# Patient Record
Sex: FEMALE | Race: BLACK OR AFRICAN AMERICAN | Employment: FULL TIME | URBAN - METROPOLITAN AREA
[De-identification: names, ages, dates, MRNs, and addresses within clinical notes are randomized per-mention and may not be internally consistent; named-entity substitution may affect disease eponyms.]

---

## 2020-04-23 ENCOUNTER — HOSPITAL ENCOUNTER (EMERGENCY)
Age: 40
Discharge: HOME | End: 2020-04-23
Attending: STUDENT IN AN ORGANIZED HEALTH CARE EDUCATION/TRAINING PROGRAM | Admitting: STUDENT IN AN ORGANIZED HEALTH CARE EDUCATION/TRAINING PROGRAM
Payer: COMMERCIAL

## 2020-04-23 VITALS
SYSTOLIC BLOOD PRESSURE: 133 MMHG | HEART RATE: 84 BPM | WEIGHT: 160 LBS | BODY MASS INDEX: 26.66 KG/M2 | HEIGHT: 65 IN | TEMPERATURE: 99.1 F | RESPIRATION RATE: 16 BRPM | DIASTOLIC BLOOD PRESSURE: 85 MMHG | OXYGEN SATURATION: 100 %

## 2020-04-23 DIAGNOSIS — B02.30 HERPES ZOSTER WITH OPHTHALMIC COMPLICATION, UNSPECIFIED HERPES ZOSTER EYE DISEASE: Primary | ICD-10-CM

## 2020-04-23 PROCEDURE — 74011250637 HC RX REV CODE- 250/637: Performed by: STUDENT IN AN ORGANIZED HEALTH CARE EDUCATION/TRAINING PROGRAM

## 2020-04-23 PROCEDURE — 99284 EMERGENCY DEPT VISIT MOD MDM: CPT

## 2020-04-23 PROCEDURE — 74011636637 HC RX REV CODE- 636/637: Performed by: STUDENT IN AN ORGANIZED HEALTH CARE EDUCATION/TRAINING PROGRAM

## 2020-04-23 PROCEDURE — 74011000250 HC RX REV CODE- 250: Performed by: STUDENT IN AN ORGANIZED HEALTH CARE EDUCATION/TRAINING PROGRAM

## 2020-04-23 RX ORDER — PREDNISONE 20 MG/1
60 TABLET ORAL DAILY
Qty: 18 TAB | Refills: 0 | Status: SHIPPED | OUTPATIENT
Start: 2020-04-24 | End: 2020-04-30

## 2020-04-23 RX ORDER — TETRACAINE HYDROCHLORIDE 5 MG/ML
1 SOLUTION OPHTHALMIC
Status: COMPLETED | OUTPATIENT
Start: 2020-04-23 | End: 2020-04-23

## 2020-04-23 RX ORDER — PREDNISONE 20 MG/1
60 TABLET ORAL
Status: COMPLETED | OUTPATIENT
Start: 2020-04-23 | End: 2020-04-23

## 2020-04-23 RX ORDER — OXYCODONE AND ACETAMINOPHEN 5; 325 MG/1; MG/1
1 TABLET ORAL
Status: DISCONTINUED | OUTPATIENT
Start: 2020-04-23 | End: 2020-04-23 | Stop reason: HOSPADM

## 2020-04-23 RX ORDER — VALACYCLOVIR HYDROCHLORIDE 500 MG/1
500 TABLET, FILM COATED ORAL
Status: COMPLETED | OUTPATIENT
Start: 2020-04-23 | End: 2020-04-23

## 2020-04-23 RX ORDER — VALACYCLOVIR HYDROCHLORIDE 500 MG/1
500 TABLET, FILM COATED ORAL 2 TIMES DAILY
Qty: 13 TAB | Refills: 0 | Status: SHIPPED | OUTPATIENT
Start: 2020-04-23 | End: 2020-04-30

## 2020-04-23 RX ADMIN — FLUORESCEIN SODIUM 1 STRIP: 1 STRIP OPHTHALMIC at 13:30

## 2020-04-23 RX ADMIN — PREDNISONE 60 MG: 20 TABLET ORAL at 14:51

## 2020-04-23 RX ADMIN — TETRACAINE HYDROCHLORIDE 1 DROP: 5 SOLUTION OPHTHALMIC at 13:30

## 2020-04-23 RX ADMIN — VALACYCLOVIR HYDROCHLORIDE 500 MG: 500 TABLET, FILM COATED ORAL at 14:51

## 2020-04-23 NOTE — ED TRIAGE NOTES
Patient reports on 4/21 she started with a rash on the right side of her face around on her eye- reports she noted swelling to her face this morning when she woke up. Patient had a tele health appointment and the dr recommended she come to the ED for evaluation. Patient denies that her vision is affected.

## 2020-04-23 NOTE — DISCHARGE INSTRUCTIONS
-Please start taking Valtrex 500mg one tablet twice a day for 7 days (start your first dose tonight)   -Please start taking Prednisone 20mg, 3 tablets once a day for 6 days (start your first dose on 4/24)  -You can use Advil, Motrin or tylenol for pain as needed     *If you experience any changes in your vision, worsening of symptoms or if they fail to resolve with treatment then you need to be seen by Ophthalmology as soon as possible or return to the ED.

## 2020-04-23 NOTE — ED PROVIDER NOTES
HPI Paula Escoto is a 44 y.o. female with h/o psoriasis and eczema presents to the ED for rash over Rt face and eye. States that 3 days ago she felt a sharp burning pain in her Rt face over her Rt eye and noticed a red rash. 2 days later the pain worsened and she developed swelling and raised rash around Rt eye. Denies any blurry vision at this time. Endorses 1 episode of vomiting yesterday. No fever, chills, cough, SOB, chest pain, diarrhea, abdominal pain, dizziness, HA, numbness, tingling or weakness. States she has chronic eczema and psoriasis on her face and has been using topical creams for this. No h/o chicken pox as a child and did not get the varicella vaccine. States all of her children have had chicken pox. No h/o shingles, no recent sick contacts. Pt wears glasses regularly but couldn't wear them today bc it was too painful. No past medical history on file. No past surgical history on file. No family history on file.     Social History     Socioeconomic History    Marital status: SINGLE     Spouse name: Not on file    Number of children: Not on file    Years of education: Not on file    Highest education level: Not on file   Occupational History    Not on file   Social Needs    Financial resource strain: Not on file    Food insecurity     Worry: Not on file     Inability: Not on file    Transportation needs     Medical: Not on file     Non-medical: Not on file   Tobacco Use    Smoking status: Not on file   Substance and Sexual Activity    Alcohol use: Not on file    Drug use: Not on file    Sexual activity: Not on file   Lifestyle    Physical activity     Days per week: Not on file     Minutes per session: Not on file    Stress: Not on file   Relationships    Social connections     Talks on phone: Not on file     Gets together: Not on file     Attends Jewish service: Not on file     Active member of club or organization: Not on file     Attends meetings of clubs or organizations: Not on file     Relationship status: Not on file    Intimate partner violence     Fear of current or ex partner: Not on file     Emotionally abused: Not on file     Physically abused: Not on file     Forced sexual activity: Not on file   Other Topics Concern    Not on file   Social History Narrative    Not on file         ALLERGIES: Patient has no known allergies. Review of Systems   Constitutional: Negative for appetite change, chills and fever. Eyes: Positive for pain and redness. Negative for visual disturbance. Respiratory: Negative for cough and shortness of breath. Cardiovascular: Negative for chest pain and palpitations. Gastrointestinal: Positive for vomiting (x1 yesterday ). Negative for abdominal pain, diarrhea and nausea. Genitourinary: Negative for dysuria. Skin: Positive for rash (vesicular rash around Rt eye ). Neurological: Negative for dizziness, weakness, numbness and headaches. Psychiatric/Behavioral: Negative for confusion. Vitals:    04/23/20 1256   BP: 133/85   Pulse: 84   Resp: 16   Temp: 99.1 °F (37.3 °C)   SpO2: 100%   Weight: 72.6 kg (160 lb)   Height: 5' 5\" (1.651 m)            Physical Exam  Vitals signs and nursing note reviewed. Constitutional:       General: She is not in acute distress. Appearance: Normal appearance. She is not ill-appearing. HENT:      Mouth/Throat:      Mouth: Mucous membranes are moist.   Eyes:      General:         Right eye: No foreign body. Extraocular Movements: Extraocular movements intact. Conjunctiva/sclera:      Right eye: Right conjunctiva is not injected. No exudate. Left eye: Left conjunctiva is not injected. No exudate. Pupils: Pupils are equal, round, and reactive to light. Comments: -Pain with EOM Rt eye   -Woods Lamp without evidence of corneal involvement, ulcer, and no evidence of injection.   No foreign body.   -Significant veronica-orbital edema and erythema (see photo below)    Neck:      Musculoskeletal: Normal range of motion. Cardiovascular:      Rate and Rhythm: Normal rate and regular rhythm. Pulses: Normal pulses. Heart sounds: Normal heart sounds. No murmur. No friction rub. No gallop. Pulmonary:      Effort: Pulmonary effort is normal. No respiratory distress. Breath sounds: Normal breath sounds. No wheezing, rhonchi or rales. Abdominal:      General: Bowel sounds are normal. There is no distension. Palpations: Abdomen is soft. There is no mass. Tenderness: There is no abdominal tenderness. There is no guarding. Musculoskeletal: Normal range of motion. Skin:     General: Skin is warm and dry. Findings: Erythema and rash present. Comments: There is vesicular rash surrounding Rt eye and Rt side of nose. It does not cross the midline. Some of the vesicles are crusted over and scabbing. No evidence of acute discharge or drainage     The left side of the face has chronic dry flaky skin consistent with Pts eczema and psorisis (Pt notes this is what it normally looks like)   *See photo below    Neurological:      General: No focal deficit present. Mental Status: She is alert. MDM     VITAL SIGNS:  Patient Vitals for the past 4 hrs:   Temp Pulse Resp BP SpO2   04/23/20 1256 99.1 °F (37.3 °C) 84 16 133/85 100 %         LABS:  No results found for this or any previous visit (from the past 6 hour(s)).      IMAGING:  No orders to display         Medications During Visit:  Medications   oxyCODONE-acetaminophen (PERCOCET) 5-325 mg per tablet 1 Tab (0 Tabs Oral Held 4/23/20 1420)   fluorescein (FUL-VIKTOR) 1 mg ophthalmic strip 1 Strip (1 Strip Both Eyes Given by Provider 4/23/20 1330)   tetracaine HCl (PF) (PONTOCAINE) 0.5 % ophthalmic solution 1 Drop (1 Drop Both Eyes Given by Provider 4/23/20 1330)   valACYclovir (VALTREX) tablet 500 mg (500 mg Oral Given 4/23/20 1451)   predniSONE (DELTASONE) tablet 60 mg (60 mg Oral Given 4/23/20 6833)         DECISION MAKING:  Danni Jean is a 44 y.o. female who comes in as above. 3 days of painful rash around Rt eye. Started with burning sharp pain and erythematous rash followed by development of vesicular rash to surrounding orbit with orbital edema (see picture above). I have a strong suspicion for ophthalmic shingles based on exam findings. Will Order: visual acuity, woods lamp exam.  Pending results will consult Ophthalmology      2:09 PM  Visual acuity testing per nurse (Pt was not wearing glasses)   BL: 20/20  Rt distance: 20/100   Lt distance: 20/25    -woods lamp exam does not show any corneal involvement without any evidence of lesions or injection. There is soft tissue swelling of the surrounding orbit only and the rash that is present in above picture. 2:40 PM  -Spoke with Dr. Hammad Amin (Optho). He agrees that Pt can go home on PO Valtrex and Prednisone and asked that she call and schedule follow up with him if symptoms worsen, fail to improve or her vision worsens. -will give first dose of Valtrex and Prednisone here and discharge with 7 days of each. COVID Screening Questions:   Experiencing any of the following symptoms: fever, chills, cough, SOB, diarrhea, URI symptoms. NO  Any Sick contacts with fever, cough, diarrhea, SOB, URI symptoms. NO  Traveled out of state or out of country. 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 Herpes zoster with ophthalmic complication, unspecified herpes zoster eye disease        DISPOSITION: Home with Ophthalmology f/u         Current Discharge Medication List      START taking these medications    Details   valACYclovir (VALTREX) 500 mg tablet Take 1 Tab by mouth two (2) times a day for 13 doses. Indications: shingles  Qty: 13 Tab, Refills: 0      predniSONE (DELTASONE) 20 mg tablet Take 60 mg by mouth daily for 6 days.   Qty: 18 Tab, Refills: 0              Follow-up Information     Follow up With Specialties Details Why Contact Jimenez Renner, DO Ophthalmology Call today Please call and schedule a follow up with Opthalmology as soon as possible  [address] [facility]  [address] [phone]                Results, treatment, and follow up plan have been discussed with patient. Questions were answered. The patient is asked to follow-up with their primary care provider in the next several days. They are to call tomorrow for an appointment. The patient is asked to return promptly for any increased concerns or worsening of symptoms. They can return to this emergency department or any other emergency department. Patient seen and discussed with Dr. Murali Walker (attending)     Juana Suarez D.O.    Family Medicine Resident PGY1    Procedures

## 2021-08-21 ENCOUNTER — APPOINTMENT (OUTPATIENT)
Dept: VASCULAR SURGERY | Age: 41
End: 2021-08-21
Attending: EMERGENCY MEDICINE
Payer: COMMERCIAL

## 2021-08-21 ENCOUNTER — HOSPITAL ENCOUNTER (EMERGENCY)
Age: 41
Discharge: HOME | End: 2021-08-21
Attending: EMERGENCY MEDICINE
Payer: COMMERCIAL

## 2021-08-21 VITALS
HEART RATE: 79 BPM | DIASTOLIC BLOOD PRESSURE: 79 MMHG | RESPIRATION RATE: 18 BRPM | SYSTOLIC BLOOD PRESSURE: 121 MMHG | TEMPERATURE: 97.3 F | OXYGEN SATURATION: 98 %

## 2021-08-21 DIAGNOSIS — M25.562 ARTHRALGIA OF LEFT LOWER LEG: Primary | ICD-10-CM

## 2021-08-21 PROCEDURE — 93971 EXTREMITY STUDY: CPT

## 2021-08-21 PROCEDURE — 99281 EMR DPT VST MAYX REQ PHY/QHP: CPT

## 2021-08-21 RX ORDER — IBUPROFEN 600 MG/1
600 TABLET ORAL
Qty: 20 TABLET | Refills: 0 | Status: SHIPPED | OUTPATIENT
Start: 2021-08-21

## 2021-08-21 NOTE — ED PROVIDER NOTES
This is a 57-year-old female who presents ambulatory to the emergency room with complaints of left calf pain. Patient states her left lower calf started to ache and feel warm on Tuesday. States that she has been so busy that she is ignored it. States today it has worsened prompting an emergency room visit. Denies any chest pain, shortness of breath, dizziness, nausea or vomiting, fevers or chills. Is on oral birth control currently. No history of blood clots in the past.  States however her family has had multiple DVTs. Has not taken any medication for her symptoms. Denies any trauma. Denies any neurological deficits. There are no further complaints at this time. None  No past medical history on file. No past surgical history on file. No past medical history on file. No past surgical history on file. No family history on file. Social History     Socioeconomic History    Marital status: SINGLE     Spouse name: Not on file    Number of children: Not on file    Years of education: Not on file    Highest education level: Not on file   Occupational History    Not on file   Tobacco Use    Smoking status: Not on file   Substance and Sexual Activity    Alcohol use: Not on file    Drug use: Not on file    Sexual activity: Not on file   Other Topics Concern    Not on file   Social History Narrative    Not on file     Social Determinants of Health     Financial Resource Strain:     Difficulty of Paying Living Expenses:    Food Insecurity:     Worried About Running Out of Food in the Last Year:     [address] in the Last Year:    Transportation Needs:     Lack of Transportation (Medical):      Lack of Transportation (Non-Medical):    Physical Activity:     Days of Exercise per Week:     Minutes of Exercise per Session:    Stress:     Feeling of Stress :    Social Connections:     Frequency of Communication with Friends and Family:     Frequency of Social Gatherings with Friends and Family:     Attends Yazidism Services:     Active Member of Clubs or Organizations:     Attends Club or Organization Meetings:     Marital Status:    Intimate Partner Violence:     Fear of Current or Ex-Partner:     Emotionally Abused:     Physically Abused:     Sexually Abused: ALLERGIES: Patient has no known allergies. Review of Systems   Constitutional: Negative for fatigue and fever. HENT: Negative for congestion, sore throat and trouble swallowing. Eyes: Negative for redness. Respiratory: Negative for chest tightness and shortness of breath. Cardiovascular: Negative for chest pain and palpitations. Gastrointestinal: Negative for abdominal distention, abdominal pain, nausea and vomiting. Endocrine: Negative. Genitourinary: Negative for difficulty urinating. Musculoskeletal: Positive for arthralgias (left leg pain). Negative for back pain, neck pain and neck stiffness. Skin: Negative for color change, pallor, rash and wound. Allergic/Immunologic: Negative. Neurological: Negative for dizziness, speech difficulty and weakness. Hematological: Does not bruise/bleed easily. Psychiatric/Behavioral: Negative for behavioral problems. The patient is not nervous/anxious. Vitals:    08/21/21 1724   BP: 121/79   Pulse: 79   Resp: 18   Temp: 97.3 °F (36.3 °C)   SpO2: 98%            Physical Exam  Vitals and nursing note reviewed. Constitutional:       General: She is not in acute distress. Appearance: Normal appearance. She is well-developed. She is not ill-appearing. HENT:      Head: Normocephalic and atraumatic. Right Ear: External ear normal.      Left Ear: External ear normal.      Nose: Nose normal. No congestion. Mouth/Throat:      Mouth: Mucous membranes are moist.   Eyes:      General:         Right eye: No discharge. Left eye: No discharge.       Conjunctiva/sclera: Conjunctivae normal.      Pupils: Pupils are equal, round, and reactive to light. Neck:      Vascular: No JVD. Trachea: No tracheal deviation. Cardiovascular:      Rate and Rhythm: Normal rate and regular rhythm. Pulses: Normal pulses. Heart sounds: Normal heart sounds. No murmur heard. No gallop. Pulmonary:      Effort: Pulmonary effort is normal. No respiratory distress. Breath sounds: Normal breath sounds. No wheezing or rales. Chest:      Chest wall: No tenderness. Abdominal:      General: Bowel sounds are normal. There is no distension. Palpations: Abdomen is soft. Tenderness: There is no abdominal tenderness. There is no guarding or rebound. Genitourinary:     Comments: Deferred    Musculoskeletal:         General: Swelling (mild left calf swelling) and tenderness (left calf pain) present. Normal range of motion. Cervical back: Normal range of motion and neck supple. Skin:     General: Skin is warm and dry. Capillary Refill: Capillary refill takes less than 2 seconds. Coloration: Skin is not pale. Findings: No erythema or rash. Neurological:      General: No focal deficit present. Mental Status: She is alert and oriented to person, place, and time. Motor: No weakness. Coordination: Coordination normal.   Psychiatric:         Mood and Affect: Mood normal.         Behavior: Behavior normal.         Thought Content: Thought content normal.         Judgment: Judgment normal.          MDM  Number of Diagnoses or Management Options  Arthralgia of left lower leg: new and requires workup  Diagnosis management comments: Differential diagnosis includes DVT, musculoskeletal pain, superficial clot and others. After physical assessment and review of imaging, patient was discharged home and will follow up with PCP. Return to the emergency room with worsening symptoms. Patient in agreement with plan of care.        Amount and/or Complexity of Data Reviewed  Tests in the radiology section of CPT®: ordered and reviewed         Labs Reviewed - No data to display  No results found. 6:39 PM  Pt has been reexamined. Negative for DVT. Pt has no new complaints, changes or physical findings. Care plan outlined and precautions discussed. All available results were reviewed with pt. All medications were reviewed with pt. All of pt's questions and concerns were addressed. Pt agrees to F/U as instructed and agrees to return to ED upon further deterioration. Pt is ready to go home. Brodie Peabody, NP    Please note that this dictation was completed with [employer], the [employer] voice recognition software. Quite often unanticipated grammatical, syntax, homophones, and other interpretive errors are inadvertently transcribed by the computer software. Please disregard these errors. Please excuse any errors that have escaped final proofreading. Thank you.     Procedures

## 2022-06-16 LAB
CHLAMYDIA, EXTERNAL: NEGATIVE
HBSAG, EXTERNAL: NEGATIVE
HEPATITIS C AB,   EXT: NEGATIVE
HIV, EXTERNAL: NONREACTIVE
N. GONORRHEA, EXTERNAL: NEGATIVE
RUBELLA, EXTERNAL: NORMAL
TYPE, ABO & RH, EXTERNAL: NORMAL

## 2022-06-20 ENCOUNTER — HOSPITAL ENCOUNTER (EMERGENCY)
Age: 42
Discharge: HOME | End: 2022-06-20
Attending: EMERGENCY MEDICINE
Payer: COMMERCIAL

## 2022-06-20 VITALS
TEMPERATURE: 98.5 F | OXYGEN SATURATION: 98 % | WEIGHT: 175 LBS | BODY MASS INDEX: 29.16 KG/M2 | HEIGHT: 65 IN | SYSTOLIC BLOOD PRESSURE: 99 MMHG | HEART RATE: 94 BPM | RESPIRATION RATE: 18 BRPM | DIASTOLIC BLOOD PRESSURE: 65 MMHG

## 2022-06-20 DIAGNOSIS — O21.9 NAUSEA AND VOMITING IN PREGNANCY: Primary | ICD-10-CM

## 2022-06-20 PROCEDURE — 96361 HYDRATE IV INFUSION ADD-ON: CPT

## 2022-06-20 PROCEDURE — 99284 EMERGENCY DEPT VISIT MOD MDM: CPT

## 2022-06-20 PROCEDURE — 74011250636 HC RX REV CODE- 250/636: Performed by: EMERGENCY MEDICINE

## 2022-06-20 PROCEDURE — 96375 TX/PRO/DX INJ NEW DRUG ADDON: CPT

## 2022-06-20 PROCEDURE — 96374 THER/PROPH/DIAG INJ IV PUSH: CPT

## 2022-06-20 PROCEDURE — 74011250637 HC RX REV CODE- 250/637: Performed by: EMERGENCY MEDICINE

## 2022-06-20 RX ORDER — ACETAMINOPHEN 500 MG
1000 TABLET ORAL
Status: COMPLETED | OUTPATIENT
Start: 2022-06-20 | End: 2022-06-20

## 2022-06-20 RX ORDER — ONDANSETRON 2 MG/ML
4 INJECTION INTRAMUSCULAR; INTRAVENOUS
Status: DISCONTINUED | OUTPATIENT
Start: 2022-06-20 | End: 2022-06-20

## 2022-06-20 RX ORDER — METOCLOPRAMIDE HYDROCHLORIDE 5 MG/ML
10 INJECTION INTRAMUSCULAR; INTRAVENOUS
Status: COMPLETED | OUTPATIENT
Start: 2022-06-20 | End: 2022-06-20

## 2022-06-20 RX ORDER — PYRIDOXINE HCL (VITAMIN B6) 25 MG
25 TABLET ORAL 2 TIMES DAILY
Qty: 28 TABLET | Refills: 0 | Status: SHIPPED | OUTPATIENT
Start: 2022-06-20 | End: 2022-07-04

## 2022-06-20 RX ADMIN — METOCLOPRAMIDE 10 MG: 5 INJECTION, SOLUTION INTRAMUSCULAR; INTRAVENOUS at 15:13

## 2022-06-20 RX ADMIN — ALUMINUM HYDROXIDE AND MAGNESIUM HYDROXIDE 30 ML: 200; 200 SUSPENSION ORAL at 15:13

## 2022-06-20 RX ADMIN — ACETAMINOPHEN 1000 MG: 500 TABLET ORAL at 15:13

## 2022-06-20 RX ADMIN — SODIUM CHLORIDE 1000 ML: 9 INJECTION, SOLUTION INTRAVENOUS at 12:37

## 2022-06-20 RX ADMIN — ONDANSETRON 4 MG: 2 INJECTION INTRAMUSCULAR; INTRAVENOUS at 12:37

## 2022-06-20 NOTE — ED TRIAGE NOTES
Patient is 8 weeks pregnant and has been vomiting all weekend, able to keep small amounts of water down.

## 2022-06-20 NOTE — ED NOTES
3 PM  Change of shift. Care of patient taken over from Dr Esdras Clay; H&P reviewed, bedside handoff complete. Awaiting re eval after reglan. 415pm re-evaluated. Tolerating PO feeling much better. Discussed trying eamon candy, b6/unisom in addition to her zofran. To follow up with OB. Sofya Conklin for [employer] home.     Austin De La Cruz, DO

## 2022-06-20 NOTE — ED PROVIDER NOTES
The history is provided by the patient. Vomiting   This is a new problem. The current episode started more than 2 days ago. The problem occurs continuously. The problem has not changed since onset. There has been no fever. Pertinent negatives include no fever, no abdominal pain and no diarrhea. Yes (8 weeks), the patient is pregnant. No past medical history on file. No past surgical history on file. No family history on file. Social History     Socioeconomic History    Marital status: SINGLE     Spouse name: Not on file    Number of children: Not on file    Years of education: Not on file    Highest education level: Not on file   Occupational History    Not on file   Tobacco Use    Smoking status: Not on file    Smokeless tobacco: Not on file   Substance and Sexual Activity    Alcohol use: Not on file    Drug use: Not on file    Sexual activity: Not on file   Other Topics Concern    Not on file   Social History Narrative    Not on file     Social Determinants of Health     Financial Resource Strain:     Difficulty of Paying Living Expenses: Not on file   Food Insecurity:     Worried About Running Out of Food in the Last Year: Not on file    Denise of Food in the Last Year: Not on file   Transportation Needs:     Lack of Transportation (Medical): Not on file    Lack of Transportation (Non-Medical):  Not on file   Physical Activity:     Days of Exercise per Week: Not on file    Minutes of Exercise per Session: Not on file   Stress:     Feeling of Stress : Not on file   Social Connections:     Frequency of Communication with Friends and Family: Not on file    Frequency of Social Gatherings with Friends and Family: Not on file    Attends Buddhism Services: Not on file    Active Member of Clubs or Organizations: Not on file    Attends Club or Organization Meetings: Not on file    Marital Status: Not on file   Intimate Partner Violence:     Fear of Current or Ex-Partner: Not on file    Emotionally Abused: Not on file    Physically Abused: Not on file    Sexually Abused: Not on file   Housing Stability:     Unable to Pay for Housing in the Last Year: Not on file    Number of Places Lived in the Last Year: Not on file    Unstable Housing in the Last Year: Not on file         ALLERGIES: Patient has no known allergies. Review of Systems   Constitutional: Negative for fever. Gastrointestinal: Positive for vomiting. Negative for abdominal pain and diarrhea. All other systems reviewed and are negative. Vitals:    06/20/22 1226   BP: 99/65   Pulse: 94   Resp: 18   Temp: 98.5 °F (36.9 °C)   SpO2: 98%   Weight: 79.4 kg (175 lb)   Height: 5' 5\" (1.651 m)            Physical Exam  Vitals and nursing note reviewed. Constitutional:       General: She is not in acute distress. Appearance: She is well-developed. HENT:      Head: Normocephalic and atraumatic. Eyes:      Conjunctiva/sclera: Conjunctivae normal.   Cardiovascular:      Rate and Rhythm: Normal rate and regular rhythm. Pulmonary:      Effort: Pulmonary effort is normal. No respiratory distress. Abdominal:      General: There is no distension. Tenderness: There is no abdominal tenderness. There is no guarding. Musculoskeletal:         General: No deformity. Normal range of motion. Cervical back: Neck supple. Skin:     General: Skin is warm and dry. Neurological:      Mental Status: She is alert. Cranial Nerves: No cranial nerve deficit. Psychiatric:         Behavior: Behavior normal.          MDM     39 y.o. female presents with vomiting in early pregnancy. Persistent vomiting despite home care measures. Has confirmed IUP without bleeding or pain. Treated with IV fluids and zofran with minimal relief now complaining of headache and persistent nausea with stomach discomfort. Will add reglan, tylenol, and maalox to regimen to help with symptoms.  Care signed out at 3 PM pending reassessment by Dr Rachael Hutchins and plan to discharge if feeling better.      Procedures

## 2022-06-26 ENCOUNTER — HOSPITAL ENCOUNTER (EMERGENCY)
Age: 42
Discharge: HOME | End: 2022-06-27
Attending: STUDENT IN AN ORGANIZED HEALTH CARE EDUCATION/TRAINING PROGRAM
Payer: COMMERCIAL

## 2022-06-26 DIAGNOSIS — O20.0 THREATENED MISCARRIAGE IN EARLY PREGNANCY: Primary | ICD-10-CM

## 2022-06-26 PROCEDURE — 36415 COLL VENOUS BLD VENIPUNCTURE: CPT

## 2022-06-26 PROCEDURE — 81001 URINALYSIS AUTO W/SCOPE: CPT

## 2022-06-26 PROCEDURE — 85025 COMPLETE CBC W/AUTO DIFF WBC: CPT

## 2022-06-26 PROCEDURE — 99284 EMERGENCY DEPT VISIT MOD MDM: CPT

## 2022-06-26 PROCEDURE — 84702 CHORIONIC GONADOTROPIN TEST: CPT

## 2022-06-26 PROCEDURE — 80053 COMPREHEN METABOLIC PANEL: CPT

## 2022-06-27 ENCOUNTER — APPOINTMENT (OUTPATIENT)
Dept: ULTRASOUND IMAGING | Age: 42
End: 2022-06-27
Attending: STUDENT IN AN ORGANIZED HEALTH CARE EDUCATION/TRAINING PROGRAM
Payer: COMMERCIAL

## 2022-06-27 VITALS
SYSTOLIC BLOOD PRESSURE: 117 MMHG | BODY MASS INDEX: 29.16 KG/M2 | TEMPERATURE: 98.7 F | OXYGEN SATURATION: 100 % | RESPIRATION RATE: 16 BRPM | HEIGHT: 65 IN | WEIGHT: 175 LBS | DIASTOLIC BLOOD PRESSURE: 67 MMHG | HEART RATE: 76 BPM

## 2022-06-27 LAB
ALBUMIN SERPL-MCNC: 3.6 G/DL (ref 3.5–5)
ALBUMIN/GLOB SERPL: 1 {RATIO} (ref 1.1–2.2)
ALP SERPL-CCNC: 50 U/L (ref 45–117)
ALT SERPL-CCNC: 24 U/L (ref 12–78)
AMORPH CRY URNS QL MICRO: ABNORMAL
ANION GAP SERPL CALC-SCNC: 7 MMOL/L (ref 5–15)
APPEARANCE UR: CLEAR
AST SERPL-CCNC: 17 U/L (ref 15–37)
BACTERIA URNS QL MICRO: ABNORMAL /HPF
BASOPHILS # BLD: 0 K/UL (ref 0–0.1)
BASOPHILS NFR BLD: 0 % (ref 0–1)
BILIRUB SERPL-MCNC: 0.4 MG/DL (ref 0.2–1)
BILIRUB UR QL: NEGATIVE
BUN SERPL-MCNC: 7 MG/DL (ref 6–20)
BUN/CREAT SERPL: 12 (ref 12–20)
CALCIUM SERPL-MCNC: 9.2 MG/DL (ref 8.5–10.1)
CHLORIDE SERPL-SCNC: 104 MMOL/L (ref 97–108)
CO2 SERPL-SCNC: 26 MMOL/L (ref 21–32)
COLOR UR: ABNORMAL
COMMENT, HOLDF: NORMAL
CREAT SERPL-MCNC: 0.59 MG/DL (ref 0.55–1.02)
DIFFERENTIAL METHOD BLD: ABNORMAL
EOSINOPHIL # BLD: 0.4 K/UL (ref 0–0.4)
EOSINOPHIL NFR BLD: 3 % (ref 0–7)
EPITH CASTS URNS QL MICRO: ABNORMAL /LPF
ERYTHROCYTE [DISTWIDTH] IN BLOOD BY AUTOMATED COUNT: 13.5 % (ref 11.5–14.5)
GLOBULIN SER CALC-MCNC: 3.5 G/DL (ref 2–4)
GLUCOSE SERPL-MCNC: 90 MG/DL (ref 65–100)
GLUCOSE UR STRIP.AUTO-MCNC: NEGATIVE MG/DL
HCG SERPL-ACNC: ABNORMAL MIU/ML (ref 0–6)
HCT VFR BLD AUTO: 36.9 % (ref 35–47)
HGB BLD-MCNC: 12.4 G/DL (ref 11.5–16)
HGB UR QL STRIP: ABNORMAL
HYALINE CASTS URNS QL MICRO: ABNORMAL /LPF (ref 0–5)
IMM GRANULOCYTES # BLD AUTO: 0.1 K/UL (ref 0–0.04)
IMM GRANULOCYTES NFR BLD AUTO: 1 % (ref 0–0.5)
KETONES UR QL STRIP.AUTO: NEGATIVE MG/DL
LEUKOCYTE ESTERASE UR QL STRIP.AUTO: NEGATIVE
LYMPHOCYTES # BLD: 1.9 K/UL (ref 0.8–3.5)
LYMPHOCYTES NFR BLD: 15 % (ref 12–49)
MCH RBC QN AUTO: 28.2 PG (ref 26–34)
MCHC RBC AUTO-ENTMCNC: 33.6 G/DL (ref 30–36.5)
MCV RBC AUTO: 84.1 FL (ref 80–99)
MONOCYTES # BLD: 1.1 K/UL (ref 0–1)
MONOCYTES NFR BLD: 8 % (ref 5–13)
NEUTS SEG # BLD: 9.5 K/UL (ref 1.8–8)
NEUTS SEG NFR BLD: 73 % (ref 32–75)
NITRITE UR QL STRIP.AUTO: NEGATIVE
NRBC # BLD: 0 K/UL (ref 0–0.01)
NRBC BLD-RTO: 0 PER 100 WBC
PH UR STRIP: 5.5 [PH] (ref 5–8)
PLATELET # BLD AUTO: 277 K/UL (ref 150–400)
PMV BLD AUTO: 9.8 FL (ref 8.9–12.9)
POTASSIUM SERPL-SCNC: 3.5 MMOL/L (ref 3.5–5.1)
PROT SERPL-MCNC: 7.1 G/DL (ref 6.4–8.2)
PROT UR STRIP-MCNC: NEGATIVE MG/DL
RBC # BLD AUTO: 4.39 M/UL (ref 3.8–5.2)
RBC #/AREA URNS HPF: ABNORMAL /HPF (ref 0–5)
SAMPLES BEING HELD,HOLD: NORMAL
SODIUM SERPL-SCNC: 137 MMOL/L (ref 136–145)
SP GR UR REFRACTOMETRY: 1.01 (ref 1–1.03)
UR CULT HOLD, URHOLD: NORMAL
UROBILINOGEN UR QL STRIP.AUTO: 1 EU/DL (ref 0.2–1)
WBC # BLD AUTO: 12.9 K/UL (ref 3.6–11)
WBC URNS QL MICRO: ABNORMAL /HPF (ref 0–4)
YEAST URNS QL MICRO: PRESENT

## 2022-06-27 PROCEDURE — 76817 TRANSVAGINAL US OBSTETRIC: CPT

## 2022-06-27 PROCEDURE — 76801 OB US < 14 WKS SINGLE FETUS: CPT

## 2022-06-27 NOTE — ED TRIAGE NOTES
Patient arrives with complaints of lower abdominal cramping and bright red vaginal bleeding, noticed blood when wiping, applied panty liner    Patient reports being 9 weeks pregnant

## 2022-06-27 NOTE — ED PROVIDER NOTES
Reg Marquez is a 39 y.o. female with history of 6 pregnancies 1 miscarriage and 1 child who  in the peripartum period with past medical history notable for vaginal bleeding, bright red blood noted within the past hour. She is approximately 9 weeks gestation. She had a IUP on ultrasound a few weeks ago in the OB/GYN office. She states that she follows at the [facility] clinic. She denies fainting. Has lightheadedness but also subjectively endorses anxiety. No past medical history on file. No past surgical history on file. No family history on file. Social History     Socioeconomic History    Marital status: SINGLE     Spouse name: Not on file    Number of children: Not on file    Years of education: Not on file    Highest education level: Not on file   Occupational History    Not on file   Tobacco Use    Smoking status: Not on file    Smokeless tobacco: Not on file   Substance and Sexual Activity    Alcohol use: Not on file    Drug use: Not on file    Sexual activity: Not on file   Other Topics Concern    Not on file   Social History Narrative    Not on file     Social Determinants of Health     Financial Resource Strain:     Difficulty of Paying Living Expenses: Not on file   Food Insecurity:     Worried About Running Out of Food in the Last Year: Not on file    Denise of Food in the Last Year: Not on file   Transportation Needs:     Lack of Transportation (Medical): Not on file    Lack of Transportation (Non-Medical):  Not on file   Physical Activity:     Days of Exercise per Week: Not on file    Minutes of Exercise per Session: Not on file   Stress:     Feeling of Stress : Not on file   Social Connections:     Frequency of Communication with Friends and Family: Not on file    Frequency of Social Gatherings with Friends and Family: Not on file    Attends Tenriism Services: Not on file    Active Member of Clubs or Organizations: Not on file  Attends Club or Organization Meetings: Not on file    Marital Status: Not on file   Intimate Partner Violence:     Fear of Current or Ex-Partner: Not on file    Emotionally Abused: Not on file    Physically Abused: Not on file    Sexually Abused: Not on file   Housing Stability:     Unable to Pay for Housing in the Last Year: Not on file    Number of [place] in the Last Year: Not on file    Unstable Housing in the Last Year: Not on file         ALLERGIES: Patient has no known allergies. Review of Systems   Constitutional: Negative for chills and fever. Eyes: Negative for photophobia. Respiratory: Negative for shortness of breath. Cardiovascular: Negative for chest pain. Gastrointestinal: Negative for abdominal pain. Genitourinary: Negative for dysuria. Musculoskeletal: Negative for back pain. Neurological: Negative for headaches. Psychiatric/Behavioral: Negative for confusion. All other systems reviewed and are negative. Vitals:    06/26/22 2244 06/27/22 0213   BP: 119/65 117/67   Pulse: 83 76   Resp: 18 16   Temp: 98.7 °F (37.1 °C)    SpO2: 98% 100%   Weight: 79.4 kg (175 lb)    Height: 5' 5\" (1.651 m)             Physical Exam  Constitutional:       General: She is not in acute distress. Appearance: She is not toxic-appearing. HENT:      Head: Normocephalic and atraumatic. Mouth/Throat:      Mouth: Mucous membranes are moist.   Eyes:      Extraocular Movements: Extraocular movements intact. Cardiovascular:      Rate and Rhythm: Normal rate and regular rhythm. Heart sounds: Normal heart sounds. Pulmonary:      Effort: Pulmonary effort is normal. No respiratory distress. Breath sounds: Normal breath sounds. Abdominal:      Palpations: Abdomen is soft. Tenderness: There is no abdominal tenderness. Musculoskeletal:      Cervical back: Normal range of motion. Right lower leg: No edema. Left lower leg: No edema.    Skin:     Capillary Refill: Capillary refill takes less than 2 seconds. Neurological:      General: No focal deficit present. Mental Status: She is alert and oriented to person, place, and time. Psychiatric:         Mood and Affect: Mood normal.         ABO Grouping B   Final [facility] ([place]): [address] [facility], [place]               Rh Factor positive           MDM  Number of Diagnoses or Management Options        MEDICAL DECISION MAKIN y.o. female presents with Pregnancy Problem    Differential diagnosis includes but not limited to: Vaginal bleeding in early pregnancy related to early miscarriage incomplete miscarriage, subchorionic hematoma    LABORATORY TESTS:  Labs Reviewed   URINALYSIS W/MICROSCOPIC - Abnormal; Notable for the following components:       Result Value    Blood LARGE (*)     Epithelial cells MODERATE (*)     Bacteria 1+ (*)     Amorphous Crystals 1+ (*)     Yeast PRESENT (*)     All other components within normal limits   BETA HCG, QT - Abnormal; Notable for the following components:    Beta HCG, ,281 (*)     All other components within normal limits   CBC WITH AUTOMATED DIFF - Abnormal; Notable for the following components:    WBC 12.9 (*)     IMMATURE GRANULOCYTES 1 (*)     ABS. NEUTROPHILS 9.5 (*)     ABS. MONOCYTES 1.1 (*)     ABS. IMM. GRANS. 0.1 (*)     All other components within normal limits   METABOLIC PANEL, COMPREHENSIVE - Abnormal; Notable for the following components:    A-G Ratio 1.0 (*)     All other components within normal limits   URINE CULTURE HOLD SAMPLE   SAMPLES BEING HELD   SAMPLE TO BLOOD BANK       IMAGING RESULTS:  US UTS TRANSVAGINAL OB   Final Result   Single living intrauterine gestation. Estimated gestational age is 9   weeks 3 days. US PREG UTS < 14 WKS SNGL   Final Result   Single living intrauterine gestation. Estimated gestational age is 9   weeks 3 days.           MEDICATIONS GIVEN:  Medications - No data to display    PROGRESS NOTE:    Patient's symptoms have improved after treatment    IMPRESSION:  1. Threatened miscarriage in early pregnancy        PLAN:  -   Discharge  Discharge Medication List as of 6/27/2022  1:53 AM        Follow-up Information     Follow up With Specialties Details Why Contact Jimenez Whiteside NP Nurse Practitioner Schedule an appointment as soon as possible for a visit   [place] [address]  [phone]  [phone]      Call your OB/GYN for follow-up  Schedule an appointment as soon as possible for a visit           Return precautions given    Cruzito Olivas MD      Please note that this dictation was completed with Phokki, the [employer] voice recognition software. Quite often unanticipated grammatical, syntax, homophones, and other interpretive errors are inadvertently transcribed by the computer software. Please disregard these errors. Please excuse any errors that have escaped final proofreading.       Procedures

## 2022-09-30 ENCOUNTER — HOSPITAL ENCOUNTER (OUTPATIENT)
Dept: ULTRASOUND IMAGING | Age: 42
Discharge: HOME | End: 2022-09-30
Attending: OBSTETRICS & GYNECOLOGY
Payer: COMMERCIAL

## 2022-09-30 ENCOUNTER — TRANSCRIBE ORDER (OUTPATIENT)
Dept: SCHEDULING | Age: 42
End: 2022-09-30

## 2022-09-30 DIAGNOSIS — M79.606 LEG PAIN: ICD-10-CM

## 2022-09-30 DIAGNOSIS — M79.606 LEG PAIN: Primary | ICD-10-CM

## 2022-09-30 PROCEDURE — 93970 EXTREMITY STUDY: CPT

## 2022-11-03 LAB
ANTIBODY SCREEN, EXTERNAL: NEGATIVE
T. PALLIDUM, EXTERNAL: NONREACTIVE

## 2022-12-30 LAB — GRBS, EXTERNAL: POSITIVE

## 2023-01-07 ENCOUNTER — HOSPITAL ENCOUNTER (EMERGENCY)
Age: 43
Discharge: HOME | End: 2023-01-07
Attending: OBSTETRICS & GYNECOLOGY | Admitting: OBSTETRICS & GYNECOLOGY
Payer: COMMERCIAL

## 2023-01-07 VITALS
HEART RATE: 102 BPM | DIASTOLIC BLOOD PRESSURE: 73 MMHG | TEMPERATURE: 98.3 F | SYSTOLIC BLOOD PRESSURE: 111 MMHG | OXYGEN SATURATION: 100 % | RESPIRATION RATE: 20 BRPM

## 2023-01-07 PROBLEM — O36.8130 DECREASED FETAL MOVEMENTS IN THIRD TRIMESTER: Status: ACTIVE | Noted: 2023-01-07

## 2023-01-07 PROCEDURE — 75810000275 HC EMERGENCY DEPT VISIT NO LEVEL OF CARE

## 2023-01-07 PROCEDURE — 99283 EMERGENCY DEPT VISIT LOW MDM: CPT

## 2023-01-08 NOTE — PROGRESS NOTES
Care assumed at this time, pt seen in bed on monitor      pt of Dr Lawanda Mcdonald with 4SVDs and last c -section, MATEUS , presented today in the ED with c/o not feeling baby movement for today, deny LOF or vag bleeding, deny any complications for this pregnancy, RICHARD grayson will check pt

## 2023-01-08 NOTE — H&P
History & Physical    Name: [facility] MRN: [id number]  SSN: [id number]    YOB: 1980  Age: 43 y.o. Sex: female        Subjective:     Estimated Date of Delivery: 23  OB History    Para Term  AB Living   7 5 5   0 4   SAB IAB Ectopic Molar Multiple Live Births   0                # Outcome Date GA Lbr Timi/2nd Weight Sex Delivery Anes PTL Lv   7 Current            6 Term 01/01/10     CS-LTranv         Complications: Anencephaly in pregnancy, Spina bifida ([address]   5             4 Term            3 Term            2 Term            1 Term                Ms. Suzanne Benitez at 44 yo  presents for complaint of reduced FM x 6 hours, with pregnancy at 42w2d for  Prenatal course was complicated by advanced maternal age. Please see prenatal records for details. No past medical history on file. No past surgical history on file. Social History     Occupational History    Not on file   Tobacco Use    Smoking status: Not on file    Smokeless tobacco: Not on file   Substance and Sexual Activity    Alcohol use: Not on file    Drug use: Not on file    Sexual activity: Not on file     No family history on file. No Known Allergies  Prior to Admission medications    Medication Sig Start Date End Date Taking? Authorizing Provider   ibuprofen (MOTRIN) 600 mg tablet Take 1 Tablet by mouth every six (6) hours as needed for Pain. 21   Kiko Kaufman NP        Review of Systems: A comprehensive review of systems was negative except for that written in the HPI. Objective:     Vitals: There were no vitals filed for this visit. Physical Exam:  Patient without distress.   Heart: Regular rate and rhythm, S1S2 present, or without murmur or extra heart sounds  Lung: clear to auscultation throughout lung fields, no wheezes, no rales, no rhonchi, and normal respiratory effort  Abdomen: soft, nontender, normal bowel sounds  Fundus: soft and non tender  Perineum: blood absent, amniotic fluid absent  Cervical Exam: deferred  Membranes:  Intact  Fetal Heart Rate: Reactive  Baseline: 130 beats per minute  Variability: moderate  Accelerations: yes  Decelerations: none  Uterine contractions: irregular  Uterine irritability: no    Prenatal Labs:   No results found for: ABORH, RUBELLAEXT, GRBSEXT, HBSAGEXT, HIVEXT, RPREXT, GONNOEXT, CHLAMEXT, ABORHEXT, RUBELLAEXT, GRBSEXT, HBSAGEXT, HIVEXT, RPREXT, GONNOEXT, CHLAMEXT      Assessment/Plan:     Active Problems:    Decreased fetal movements in third trimester (1/7/2023)      Overview: Reports decreased FM starting at 1600, baby had been moving well all day       and then significantly reduced movement noted. Plan: Discharge to home, labor precautions given. Danger signs reviewed. Group B Strep was positive, will treat prophylactically with penicillin.     Ulysses Ast, CNM

## 2023-01-20 ENCOUNTER — HOSPITAL ENCOUNTER (EMERGENCY)
Age: 43
Discharge: HOME | End: 2023-01-20
Attending: OBSTETRICS & GYNECOLOGY | Admitting: OBSTETRICS & GYNECOLOGY
Payer: COMMERCIAL

## 2023-01-20 VITALS
TEMPERATURE: 98.2 F | RESPIRATION RATE: 16 BRPM | OXYGEN SATURATION: 97 % | BODY MASS INDEX: 31.65 KG/M2 | SYSTOLIC BLOOD PRESSURE: 114 MMHG | DIASTOLIC BLOOD PRESSURE: 67 MMHG | HEART RATE: 83 BPM | WEIGHT: 190 LBS | HEIGHT: 65 IN

## 2023-01-20 LAB
A1 MICROGLOB PLACENTAL VAG QL: NEGATIVE
CONTROL LINE PRESENT?: NORMAL
EXPIRATION DATE: NORMAL
INTERNAL NEGATIVE CONTROL: NORMAL
KIT LOT NO.: NORMAL

## 2023-01-20 PROCEDURE — 75810000275 HC EMERGENCY DEPT VISIT NO LEVEL OF CARE

## 2023-01-20 PROCEDURE — 99284 EMERGENCY DEPT VISIT MOD MDM: CPT

## 2023-01-20 PROCEDURE — 84112 EVAL AMNIOTIC FLUID PROTEIN: CPT | Performed by: OBSTETRICS & GYNECOLOGY

## 2023-01-20 NOTE — PROGRESS NOTES
1830 PT ambulates onto unit for rule out SROM. PT reports leaking of fluid and mild cramping. [address] test performed. [address] per Shakeel Delgado RN, 1/50/-3, posterior cervix. Amnisure negative. [address] [facility] Dr. Candy Castillo updated on PT status and negative amnisure. 1900 SBAR report given to P. Lethea Schwab, RN. Care of PT handed off at this time.

## 2023-01-21 ENCOUNTER — HOSPITAL ENCOUNTER (INPATIENT)
Age: 43
LOS: 2 days | Discharge: HOME | End: 2023-01-24
Attending: OBSTETRICS & GYNECOLOGY | Admitting: OBSTETRICS & GYNECOLOGY
Payer: COMMERCIAL

## 2023-01-21 PROCEDURE — 75410000000 HC DELIVERY VAGINAL/SINGLE: Performed by: ADVANCED PRACTICE MIDWIFE

## 2023-01-21 PROCEDURE — 75810000275 HC EMERGENCY DEPT VISIT NO LEVEL OF CARE

## 2023-01-21 PROCEDURE — 75410000002 HC LABOR FEE PER 1 HR: Performed by: ADVANCED PRACTICE MIDWIFE

## 2023-01-21 PROCEDURE — 4A1HXCZ MONITORING OF PRODUCTS OF CONCEPTION, CARDIAC RATE, EXTERNAL APPROACH: ICD-10-PCS | Performed by: OBSTETRICS & GYNECOLOGY

## 2023-01-21 PROCEDURE — 76060000078 HC EPIDURAL ANESTHESIA: Performed by: ANESTHESIOLOGY

## 2023-01-21 PROCEDURE — 75410000003 HC RECOV DEL/VAG/CSECN EA 0.5 HR: Performed by: ADVANCED PRACTICE MIDWIFE

## 2023-01-21 RX ORDER — ACETAMINOPHEN 325 MG/1
650 TABLET ORAL
Status: DISCONTINUED | OUTPATIENT
Start: 2023-01-21 | End: 2023-01-22 | Stop reason: HOSPADM

## 2023-01-21 RX ORDER — CALCIUM CARBONATE 200(500)MG
400 TABLET,CHEWABLE ORAL
Status: DISCONTINUED | OUTPATIENT
Start: 2023-01-21 | End: 2023-01-22 | Stop reason: HOSPADM

## 2023-01-21 RX ORDER — NALOXONE HYDROCHLORIDE 0.4 MG/ML
0.4 INJECTION, SOLUTION INTRAMUSCULAR; INTRAVENOUS; SUBCUTANEOUS AS NEEDED
Status: DISCONTINUED | OUTPATIENT
Start: 2023-01-21 | End: 2023-01-22 | Stop reason: HOSPADM

## 2023-01-21 RX ORDER — NALBUPHINE HYDROCHLORIDE 10 MG/ML
10 INJECTION, SOLUTION INTRAMUSCULAR; INTRAVENOUS; SUBCUTANEOUS
Status: DISCONTINUED | OUTPATIENT
Start: 2023-01-21 | End: 2023-01-22 | Stop reason: HOSPADM

## 2023-01-21 RX ORDER — MINERAL OIL
120 OIL (ML) ORAL ONCE
Status: ACTIVE | OUTPATIENT
Start: 2023-01-22 | End: 2023-01-22

## 2023-01-21 RX ORDER — ONDANSETRON 2 MG/ML
4 INJECTION INTRAMUSCULAR; INTRAVENOUS
Status: DISCONTINUED | OUTPATIENT
Start: 2023-01-21 | End: 2023-01-22 | Stop reason: HOSPADM

## 2023-01-21 NOTE — ED PROVIDER NOTES
[facility] Evaluation    Name: Eleanor Alvarenga MRN: [id number]  SSN: [id number]    YOB: 1980  Age: 43 y.o. Sex: female      Subjective:     Reason for Admission:  Pregnancy and SROM    History of Present Illness: Eleanor Alvarenga is a 43 y.o.  female [id number] with an estimated gestational age of 36w3d with Estimated Date of Delivery: 23. Patient complains of SROM. She denies contractions, vaginal bleeding, decreased fetal movement, headache, or scotomata. Prenatal care has been uneventful. OB History          7    Para   5    Term   5            AB   0    Living   4         SAB   0    IAB        Ectopic        Molar        Multiple        Live Births                  Past Medical History:   Diagnosis Date    Anemia     Herpes simplex virus (HSV) infection     Postpartum depression      Past Surgical History:   Procedure Laterality Date    HX  SECTION      HX LEEP PROCEDURE N/A     HX OTHER SURGICAL Right     SHOULDER     Social History     Occupational History    Not on file   Tobacco Use    Smoking status: Not on file    Smokeless tobacco: Not on file   Substance and Sexual Activity    Alcohol use: Not on file    Drug use: Not on file    Sexual activity: Not on file     No family history on file. No Known Allergies  Prior to Admission medications    Medication Sig Start Date End Date Taking? Authorizing Provider   PNV Comb #2-Iron-Omega 3-FA 30-7-420-200 mg cmpk Take  by mouth. Yes Provider, Historical   ibuprofen (MOTRIN) 600 mg tablet Take 1 Tablet by mouth every six (6) hours as needed for Pain.   Patient not taking: Reported on 2023   Rosenda Kaufman NP        Review of Systems-see HPI    Objective:     Vitals:    Vitals:    23 1829 23 1834 23 1839   BP: 114/67     Pulse: 83     Resp: 16     Temp: 98.2 °F (36.8 °C)     SpO2: 98% 99% 97%   Weight:   86.2 kg (190 lb)   Height:   5' 5\" (1.651 m)      Temp (24hrs), Av.2 °F (36.8 °C), Min:98.2 °F (36.8 °C), Max:98.2 °F (36.8 °C)    BP  Min: 114/67  Max: 114/67     Physical Exam    Cervical Exam: 1 cm dilated    50% effaced    -2 station    Presenting Part: cephalic  Uterine Activity: None  Membranes: Intact  Fetal Heart Rate: Reactive       Labs:   Recent Results (from the past 24 hour(s))   RUPTURE OF FETAL MEMBRANES, POC    Collection Time: 23  6:40 PM   Result Value Ref Range    Rupture of fetal membrane Negative Negative    Control line present? Acceptable     Internal negative control Acceptable     Kit Lot No.      Expiration date         Patient Active Problem List   Diagnosis Code    Decreased fetal movements in third trimester O36.8130     Assessment and Plan:   Impression: IUP at 39 weeks 1 day not in labor.     Plan: Discharge home with labor precautions        Signed By:  Margarita Goode MD     2023

## 2023-01-21 NOTE — PROGRESS NOTES
1900- report received from STEPHANIE Jameson at bedside   9949- discharge instructions reviewed with pt   1913- pt ambulating off of unit with partner at this time

## 2023-01-22 ENCOUNTER — ANESTHESIA (OUTPATIENT)
Dept: LABOR AND DELIVERY | Age: 43
End: 2023-01-22
Payer: COMMERCIAL

## 2023-01-22 ENCOUNTER — ANESTHESIA EVENT (OUTPATIENT)
Dept: LABOR AND DELIVERY | Age: 43
End: 2023-01-22
Payer: COMMERCIAL

## 2023-01-22 LAB
ABO + RH BLD: NORMAL
BASOPHILS # BLD: 0 K/UL (ref 0–0.1)
BASOPHILS NFR BLD: 0 % (ref 0–1)
BLOOD GROUP ANTIBODIES SERPL: NORMAL
DIFFERENTIAL METHOD BLD: ABNORMAL
EOSINOPHIL # BLD: 0.5 K/UL (ref 0–0.4)
EOSINOPHIL NFR BLD: 4 % (ref 0–7)
ERYTHROCYTE [DISTWIDTH] IN BLOOD BY AUTOMATED COUNT: 13.8 % (ref 11.5–14.5)
HCT VFR BLD AUTO: 34.7 % (ref 35–47)
HGB BLD-MCNC: 11.5 G/DL (ref 11.5–16)
IMM GRANULOCYTES # BLD AUTO: 0.1 K/UL (ref 0–0.04)
IMM GRANULOCYTES NFR BLD AUTO: 1 % (ref 0–0.5)
LYMPHOCYTES # BLD: 1.8 K/UL (ref 0.8–3.5)
LYMPHOCYTES NFR BLD: 14 % (ref 12–49)
MCH RBC QN AUTO: 28.2 PG (ref 26–34)
MCHC RBC AUTO-ENTMCNC: 33.1 G/DL (ref 30–36.5)
MCV RBC AUTO: 85 FL (ref 80–99)
MONOCYTES # BLD: 1 K/UL (ref 0–1)
MONOCYTES NFR BLD: 8 % (ref 5–13)
NEUTS SEG # BLD: 9.4 K/UL (ref 1.8–8)
NEUTS SEG NFR BLD: 73 % (ref 32–75)
NRBC # BLD: 0 K/UL (ref 0–0.01)
NRBC BLD-RTO: 0 PER 100 WBC
PLATELET # BLD AUTO: 286 K/UL (ref 150–400)
PMV BLD AUTO: 10.1 FL (ref 8.9–12.9)
RBC # BLD AUTO: 4.08 M/UL (ref 3.8–5.2)
SPECIMEN EXP DATE BLD: NORMAL
WBC # BLD AUTO: 12.8 K/UL (ref 3.6–11)

## 2023-01-22 PROCEDURE — 65270000029 HC RM PRIVATE

## 2023-01-22 PROCEDURE — 36415 COLL VENOUS BLD VENIPUNCTURE: CPT

## 2023-01-22 PROCEDURE — 74011000250 HC RX REV CODE- 250: Performed by: ANESTHESIOLOGY

## 2023-01-22 PROCEDURE — 10H07YZ INSERTION OF OTHER DEVICE INTO PRODUCTS OF CONCEPTION, VIA NATURAL OR ARTIFICIAL OPENING: ICD-10-PCS | Performed by: OBSTETRICS & GYNECOLOGY

## 2023-01-22 PROCEDURE — 74011250636 HC RX REV CODE- 250/636: Performed by: OBSTETRICS & GYNECOLOGY

## 2023-01-22 PROCEDURE — 10907ZC DRAINAGE OF AMNIOTIC FLUID, THERAPEUTIC FROM PRODUCTS OF CONCEPTION, VIA NATURAL OR ARTIFICIAL OPENING: ICD-10-PCS | Performed by: OBSTETRICS & GYNECOLOGY

## 2023-01-22 PROCEDURE — 00HU33Z INSERTION OF INFUSION DEVICE INTO SPINAL CANAL, PERCUTANEOUS APPROACH: ICD-10-PCS | Performed by: ANESTHESIOLOGY

## 2023-01-22 PROCEDURE — 74011000250 HC RX REV CODE- 250: Performed by: OBSTETRICS & GYNECOLOGY

## 2023-01-22 PROCEDURE — 75410000002 HC LABOR FEE PER 1 HR: Performed by: ADVANCED PRACTICE MIDWIFE

## 2023-01-22 PROCEDURE — 74011250637 HC RX REV CODE- 250/637: Performed by: ADVANCED PRACTICE MIDWIFE

## 2023-01-22 PROCEDURE — 85025 COMPLETE CBC W/AUTO DIFF WBC: CPT

## 2023-01-22 PROCEDURE — 86900 BLOOD TYPING SEROLOGIC ABO: CPT

## 2023-01-22 PROCEDURE — 74011000258 HC RX REV CODE- 258: Performed by: OBSTETRICS & GYNECOLOGY

## 2023-01-22 PROCEDURE — 74011000258 HC RX REV CODE- 258

## 2023-01-22 RX ORDER — OXYTOCIN/RINGER'S LACTATE 30/500 ML
87.3 PLASTIC BAG, INJECTION (ML) INTRAVENOUS AS NEEDED
Status: DISCONTINUED | OUTPATIENT
Start: 2023-01-22 | End: 2023-01-24 | Stop reason: HOSPADM

## 2023-01-22 RX ORDER — FENTANYL/BUPIVACAINE/NS/PF 2-1250MCG
1-16 PREFILLED PUMP RESERVOIR EPIDURAL CONTINUOUS
Status: DISCONTINUED | OUTPATIENT
Start: 2023-01-22 | End: 2023-01-22 | Stop reason: HOSPADM

## 2023-01-22 RX ORDER — BUPIVACAINE HYDROCHLORIDE 2.5 MG/ML
INJECTION, SOLUTION EPIDURAL; INFILTRATION; INTRACAUDAL AS NEEDED
Status: DISCONTINUED | OUTPATIENT
Start: 2023-01-22 | End: 2023-01-22 | Stop reason: HOSPADM

## 2023-01-22 RX ORDER — IBUPROFEN 600 MG/1
600 TABLET ORAL EVERY 6 HOURS
Status: DISCONTINUED | OUTPATIENT
Start: 2023-01-22 | End: 2023-01-24 | Stop reason: HOSPADM

## 2023-01-22 RX ORDER — OXYTOCIN/RINGER'S LACTATE 30/500 ML
87.3 PLASTIC BAG, INJECTION (ML) INTRAVENOUS AS NEEDED
Status: COMPLETED | OUTPATIENT
Start: 2023-01-22 | End: 2023-01-22

## 2023-01-22 RX ORDER — LANOLIN ALCOHOL/MO/W.PET/CERES
400 CREAM (GRAM) TOPICAL DAILY
COMMUNITY

## 2023-01-22 RX ORDER — ONDANSETRON 4 MG/1
4 TABLET, FILM COATED ORAL
COMMUNITY

## 2023-01-22 RX ORDER — ACETAMINOPHEN 500 MG
1000 TABLET ORAL EVERY 6 HOURS
Status: DISCONTINUED | OUTPATIENT
Start: 2023-01-22 | End: 2023-01-24 | Stop reason: HOSPADM

## 2023-01-22 RX ORDER — SODIUM CHLORIDE 0.9 % (FLUSH) 0.9 %
5-40 SYRINGE (ML) INJECTION EVERY 8 HOURS
Status: DISCONTINUED | OUTPATIENT
Start: 2023-01-22 | End: 2023-01-24

## 2023-01-22 RX ORDER — MINERAL OIL
OIL (ML) ORAL
Status: DISPENSED
Start: 2023-01-22 | End: 2023-01-22

## 2023-01-22 RX ORDER — SODIUM CHLORIDE 900 MG/100ML
INJECTION INTRAVENOUS
Status: COMPLETED
Start: 2023-01-22 | End: 2023-01-22

## 2023-01-22 RX ORDER — NALOXONE HYDROCHLORIDE 0.4 MG/ML
0.4 INJECTION, SOLUTION INTRAMUSCULAR; INTRAVENOUS; SUBCUTANEOUS AS NEEDED
Status: DISCONTINUED | OUTPATIENT
Start: 2023-01-22 | End: 2023-01-24 | Stop reason: HOSPADM

## 2023-01-22 RX ORDER — EPHEDRINE SULFATE/0.9% NACL/PF 50 MG/5 ML
20 SYRINGE (ML) INTRAVENOUS
Status: DISCONTINUED | OUTPATIENT
Start: 2023-01-22 | End: 2023-01-22 | Stop reason: HOSPADM

## 2023-01-22 RX ORDER — SODIUM CHLORIDE 0.9 % (FLUSH) 0.9 %
5-40 SYRINGE (ML) INJECTION AS NEEDED
Status: DISCONTINUED | OUTPATIENT
Start: 2023-01-22 | End: 2023-01-24 | Stop reason: HOSPADM

## 2023-01-22 RX ORDER — OXYTOCIN/RINGER'S LACTATE 30/500 ML
10 PLASTIC BAG, INJECTION (ML) INTRAVENOUS AS NEEDED
Status: DISCONTINUED | OUTPATIENT
Start: 2023-01-22 | End: 2023-01-24 | Stop reason: HOSPADM

## 2023-01-22 RX ORDER — MISOPROSTOL 200 UG/1
800 TABLET ORAL ONCE
Status: COMPLETED | OUTPATIENT
Start: 2023-01-22 | End: 2023-01-22

## 2023-01-22 RX ORDER — SODIUM CHLORIDE, SODIUM LACTATE, POTASSIUM CHLORIDE, CALCIUM CHLORIDE 600; 310; 30; 20 MG/100ML; MG/100ML; MG/100ML; MG/100ML
125 INJECTION, SOLUTION INTRAVENOUS CONTINUOUS
Status: DISCONTINUED | OUTPATIENT
Start: 2023-01-22 | End: 2023-01-24

## 2023-01-22 RX ORDER — ZOLPIDEM TARTRATE 5 MG/1
5 TABLET ORAL
Status: DISCONTINUED | OUTPATIENT
Start: 2023-01-22 | End: 2023-01-24 | Stop reason: HOSPADM

## 2023-01-22 RX ORDER — LIDOCAINE HYDROCHLORIDE AND EPINEPHRINE 15; 5 MG/ML; UG/ML
INJECTION, SOLUTION EPIDURAL AS NEEDED
Status: DISCONTINUED | OUTPATIENT
Start: 2023-01-22 | End: 2023-01-22 | Stop reason: HOSPADM

## 2023-01-22 RX ORDER — HYDROCORTISONE ACETATE PRAMOXINE HCL 2.5; 1 G/100G; G/100G
CREAM TOPICAL AS NEEDED
Status: DISCONTINUED | OUTPATIENT
Start: 2023-01-22 | End: 2023-01-22 | Stop reason: RX

## 2023-01-22 RX ADMIN — Medication 10 ML/HR: at 10:54

## 2023-01-22 RX ADMIN — SODIUM CHLORIDE, PRESERVATIVE FREE 10 ML: 5 INJECTION INTRAVENOUS at 09:05

## 2023-01-22 RX ADMIN — IBUPROFEN 600 MG: 600 TABLET, FILM COATED ORAL at 19:58

## 2023-01-22 RX ADMIN — LIDOCAINE HYDROCHLORIDE AND EPINEPHRINE 3 ML: 15; 5 INJECTION, SOLUTION EPIDURAL at 10:44

## 2023-01-22 RX ADMIN — AMPICILLIN SODIUM 2 G: 2 INJECTION, POWDER, FOR SOLUTION INTRAMUSCULAR; INTRAVENOUS at 00:51

## 2023-01-22 RX ADMIN — SODIUM CHLORIDE, POTASSIUM CHLORIDE, SODIUM LACTATE AND CALCIUM CHLORIDE 125 ML/HR: 600; 310; 30; 20 INJECTION, SOLUTION INTRAVENOUS at 09:09

## 2023-01-22 RX ADMIN — SODIUM CHLORIDE 50 ML: 900 INJECTION INTRAVENOUS at 09:10

## 2023-01-22 RX ADMIN — BUPIVACAINE HYDROCHLORIDE 8 ML: 2.5 INJECTION, SOLUTION EPIDURAL; INFILTRATION; INTRACAUDAL; PERINEURAL at 10:43

## 2023-01-22 RX ADMIN — OXYTOCIN 999 MILLI-UNITS/MIN: 10 INJECTION INTRAVENOUS at 12:11

## 2023-01-22 RX ADMIN — MISOPROSTOL 800 MCG: 200 TABLET ORAL at 12:44

## 2023-01-22 RX ADMIN — AMPICILLIN SODIUM 1 G: 1 INJECTION, POWDER, FOR SOLUTION INTRAMUSCULAR; INTRAVENOUS at 09:07

## 2023-01-22 RX ADMIN — ACETAMINOPHEN 1000 MG: 500 TABLET ORAL at 17:40

## 2023-01-22 RX ADMIN — ACETAMINOPHEN 1000 MG: 500 TABLET ORAL at 23:53

## 2023-01-22 RX ADMIN — AMPICILLIN SODIUM 1 G: 1 INJECTION, POWDER, FOR SOLUTION INTRAMUSCULAR; INTRAVENOUS at 04:59

## 2023-01-22 RX ADMIN — IBUPROFEN 600 MG: 600 TABLET, FILM COATED ORAL at 13:20

## 2023-01-22 NOTE — H&P
History & Physical    Name: Chava Lopez MRN: [id number]  SSN: [id number]    YOB: 1980  Age: 43 y.o. Sex: female        Subjective:     Estimated Date of Delivery: 23  OB History    Para Term  AB Living   7 5 5   0 4   SAB IAB Ectopic Molar Multiple Live Births   0                # Outcome Date GA Lbr Timi/2nd Weight Sex Delivery Anes PTL Lv   7 Current            6 Term 01/01/10     CS-LTranv         Complications: Anencephaly in pregnancy, Spina bifida ([address]   5             4 Term            3 Term            2 Term            1 Term                Ms. Mora Mcclain is admitted with pregnancy at 39w2d for Increasingly painful contractions. Prenatal course was complicated by advanced maternal age and history of previous  section . Please see prenatal records for details. Past Medical History:   Diagnosis Date    Anemia     Herpes simplex virus (HSV) infection     Postpartum depression      Past Surgical History:   Procedure Laterality Date    HX  SECTION      HX LEEP PROCEDURE N/A     HX OTHER SURGICAL Right     SHOULDER     Social History     Occupational History    Not on file   Tobacco Use    Smoking status: Not on file    Smokeless tobacco: Not on file   Substance and Sexual Activity    Alcohol use: Not on file    Drug use: Not on file    Sexual activity: Not on file     No family history on file. No Known Allergies  Prior to Admission medications    Medication Sig Start Date End Date Taking? Authorizing Provider   PNV Comb #2-Iron-Omega 3-FA 27-4-370-200 mg cmpk Take  by mouth. Provider, Historical   ibuprofen (MOTRIN) 600 mg tablet Take 1 Tablet by mouth every six (6) hours as needed for Pain. Patient not taking: Reported on 2023   Stephanie Kaufman NP        Review of Systems: A comprehensive review of systems was negative except for that written in the HPI. Objective:     Vitals: There were no vitals filed for this visit. Physical Exam:  Patient without distress. Heart: Regular rate and rhythm, S1S2 present, or without murmur or extra heart sounds  Lung: clear to auscultation throughout lung fields, no wheezes, no rales, no rhonchi, and normal respiratory effort  Abdomen: soft, nontender  Perineum: blood absent, amniotic fluid absent  Cervical Exam: 2 cm dilated    90% effaced    -2 station    Presenting Part: cephalic  Lower Extremities:  - No evidence of DVT seen on physical exam.  Membranes:  Intact  Fetal Heart Rate: Reactive    Prenatal Labs:   No results found for: ABORH, RUBELLAEXT, GRBSEXT, HBSAGEXT, HIVEXT, RPREXT, GONNOEXT, CHLAMEXT, ABORHEXT, RUBELLAEXT, GRBSEXT, HBSAGEXT, HIVEXT, RPREXT, GONNOEXT, CHLAMEXT      Assessment/Plan:     Active Problems:    * No active hospital problems. *       Plan: Admit for Labor  Continue expectant management, Continue plan for vaginal delivery, TOLAC - pt and  advised of risk associated with TOLAC including but not limited to: bleeding, uterine rupture, fetal morbidity and mortality, maternal morbidity and mortality. Plan AROM and IUPC after second dose of antibiotics. Group B Strep was positive, will treat prophylactically with penicillin. Hx HSV - no evidence of lesions on examination.     Mireya Miller M.D.

## 2023-01-22 NOTE — ANESTHESIA PREPROCEDURE EVALUATION
Relevant Problems   No relevant active problems       Anesthetic History   No history of anesthetic complications            Review of Systems / Medical History  Patient summary reviewed and pertinent labs reviewed    Pulmonary  Within defined limits                 Neuro/Psych         Psychiatric history     Cardiovascular  Within defined limits                Exercise tolerance: >4 METS     GI/Hepatic/Renal  Within defined limits              Endo/Other        Anemia     Other Findings              Physical Exam    Airway  Mallampati: II  TM Distance: 4 - 6 cm  Neck ROM: normal range of motion   Mouth opening: Normal     Cardiovascular  Regular rate and rhythm,  S1 and S2 normal,  no murmur, click, rub, or gallop  Rhythm: regular  Rate: normal         Dental  No notable dental hx       Pulmonary  Breath sounds clear to auscultation               Abdominal  GI exam deferred       Other Findings            Anesthetic Plan    ASA: 2  Anesthesia type: epidural            Anesthetic plan and risks discussed with: Patient      Charting completed after epidural placement.

## 2023-01-22 NOTE — PROGRESS NOTES
Labor Progress Note  Patient seen, fetal heart rate and contraction pattern evaluated, patient examined. No data found. Physical Exam:  Cervical Exam:  4 cm dilated    100% effaced    -1 station    Presenting Part: cephalic  Membranes:  Artificial Rupture of Membranes; Amniotic Fluid: medium amount of thin meconium fluid  Uterine Activity: Frequency: Every 3-4 minutes  Fetal Heart Rate: Reactive  Baseline: 160 per minute  Variability: moderate  Accelerations: yes    Assessment/Plan:  Reassuring fetal status, Labor  Progressing normally, Continue plan for vaginal delivery. IUPC placed for monitoring secondary to TOLAC status.      Keyanna Rowan M.D.

## 2023-01-22 NOTE — PROGRESS NOTES
0715: Bedside and Verbal shift change report given to Erlinda Damon RN and Aaron Mcclelland RN (oncoming nurse) by Radha Rudd RN (offgoing nurse). Report included the following information SBAR, Kardex, MAR, and Recent Results    0720: Patient states she is feeling pressure and need to push. Vaginal exam performed at this time and noted to be 5 cm dilated and -1 station, effacement difficult to determine. 46: Patient states she is feeling pressure again. Repeat vaginal exam performed and noted to be unchanged from previous exam. Patient states contraction pain is 10/10. Patient was offered epidural, IV pain medication, nitrous gas, however patient declining all at this time. States will like to continue natural labor, advised to notify nursing staff if changes mind. Patient's significant other providing counter pressure and support during contractions. 0945: Patient states she is feeling pressure again, vaginal exam performed and unchanged. 1000: Dr. Jelena Lord at the bedside at this time. Spoke to patient and patient has decided to get an epidural at this time to help with pain control. Bolus started. 1010: Dr. Launie Klinefelter with anesthesia notified of need for epidural, awaiting bolus to be complete at this time. [facility] MD at bedside     1036: Timeout complete. 1040: Epidural complete. 1042: Patient laid back in bed at this time. 1145: Patient states feeling a lot of pressure patient checked and is complete. Midwife called at this time. 1150: Midwife [address] at bedside. 1155: Pushing started. Patient actively pushing. RN remains in continuous attendance at the bedside. Assessment & evaluation of fetal heart rate ongoing via continuous EFM. 1159: Baby delivered. 1249: Fundus noted to be deviated to the right, patient got up to void but was unsuccessful. Straight cath performed and got 125 mL out, fundus back to midline.      1425: Asked patient if she wanted to attempt to get up and void at this time, she states she does not have the urge to void and would like to take a quick nap.     1520- SBAR to Rosalind Palomino RN MIU at this time         .

## 2023-01-22 NOTE — PROGRESS NOTES
2248: Patient ambulating onto the unit at this time with her . Chief complaint: contractions which started at 4 PM per the patient. 2301: Patient placed on TOCO and EFM at this time. 2315: SVE at this time by this RN- patient is 2/90/-2.    2338: Patient off TOCO and EFM at this time. Patient strip is reactive and reassuring. Verified with Dr Brielle Giordano. VORB at this time- patient may come off the monitor at this time and be monitored intermittently. 3099: VORB per Dr Hannah Score- Patient needs to be intermittently monitored every 15 minutes for each hour. 5354: Patient placed on the monitor. 7542: Strip is reactive and reassuring at this time. Verified by Gini Feliciano RN    9084: SVE at this time by ADONIS Lawler RN- patient is 2/100/0.    0530: Patient off EFM and TOCO at this time. Strip is reassuring and reactive. Verified with Chetan Velazquez RN.    4220: This RN updating Dr Chong Vaca about patient condition and cervical check. Plan to break her water and place IUPC.    0543: MD Chong Vaca at the bedside to evaluate patient at this time. 0544: AROM at this time by Chong Vaca MD. Meconium stained fluid, moderate, no odor. 5591: IUPC placed at this time. 0543: This RN remaining at the bedside providing emotional support and counter-pressure/ hip pressure. 0730: Bedside and Verbal shift change report given to Antoinette Perez RN (oncoming nurse) by Lamine Maldonado RN (offgoing nurse). Report included the following information SBAR, Kardex, Procedure Summary, Intake/Output, MAR, Accordion, Recent Results, Med Rec Status, Alarm Parameters , and Quality Measures.

## 2023-01-22 NOTE — ANESTHESIA PROCEDURE NOTES
Epidural Block    Patient location during procedure: OB  Start time: 1/22/2023 10:37 AM  End time: 1/22/2023 10:45 AM  Reason for block: labor epidural  Staffing  Performed: attending   Preanesthetic Checklist  Completed: patient identified, risks and benefits discussed, surgical consent, pre-op evaluation and timeout performed  Block Placement  Patient position: sitting  Prep: Betadine  Sterility prep: cap, drape, gloves and mask  Sedation level: no sedation  Patient monitoring: heart rate, frequent blood pressure checks and continuous pulse oximetry  Approach: midline  Location: lumbar  Lumbar location: L3-L4  Epidural  Loss of resistance technique: saline  Guidance: landmark technique  Needle  Needle type: Tuohy   Needle gauge: 16 G  Catheter type: multi-orifice  Catheter size: 20 G  Catheter securement method: clear occlusive dressing and surgical tape  Test dose: negative  Assessment  Number of attempts: 1  Procedure assessment: patient tolerated procedure well with no immediate complications

## 2023-01-23 PROCEDURE — 74011250637 HC RX REV CODE- 250/637: Performed by: ADVANCED PRACTICE MIDWIFE

## 2023-01-23 PROCEDURE — 74011000250 HC RX REV CODE- 250: Performed by: OBSTETRICS & GYNECOLOGY

## 2023-01-23 PROCEDURE — 65270000029 HC RM PRIVATE

## 2023-01-23 RX ADMIN — IBUPROFEN 600 MG: 600 TABLET, FILM COATED ORAL at 22:05

## 2023-01-23 RX ADMIN — IBUPROFEN 600 MG: 600 TABLET, FILM COATED ORAL at 15:26

## 2023-01-23 RX ADMIN — ACETAMINOPHEN 1000 MG: 500 TABLET ORAL at 06:10

## 2023-01-23 RX ADMIN — ACETAMINOPHEN 1000 MG: 500 TABLET ORAL at 17:56

## 2023-01-23 RX ADMIN — IBUPROFEN 600 MG: 600 TABLET, FILM COATED ORAL at 09:29

## 2023-01-23 RX ADMIN — SODIUM CHLORIDE, PRESERVATIVE FREE 10 ML: 5 INJECTION INTRAVENOUS at 02:46

## 2023-01-23 RX ADMIN — IBUPROFEN 600 MG: 600 TABLET, FILM COATED ORAL at 02:45

## 2023-01-23 NOTE — PROGRESS NOTES
Post-Partum Day Number 1 Progress Note    Thaddeus Daniel     Assessment: Doing well, post partum day 1 from  p term labor. Plan:  - Continue routine postpartum and perineal care as well as maternal education.  - N/A   - Plan discharge home [address] Discharge Date: Tomorrow. Information for the patient's :  Herman Bergeron [[id number]]   , Spontaneous  Patient doing well without significant complaint. Voiding without difficulty, normal lochia. Vitals:  Visit Vitals  /68   Pulse 69   Temp 97.4 °F (36.3 °C)   Resp 16   Ht 5' 5\" (1.651 m)   Wt 86.2 kg (190 lb)   LMP 2022   SpO2 96%   Breastfeeding Yes   BMI 31.62 kg/m²     Temp (24hrs), Av.2 °F (36.8 °C), Min:97.4 °F (36.3 °C), Max:99.4 °F (37.4 °C)        Exam:   Patient without distress. Fundus firm, nontender per nursing fundal checks. Perineum with normal lochia noted per nursing assessment. Lower extremities are negative for pathological edema. Labs:       No results found for this or any previous visit (from the past 24 hour(s)).

## 2023-01-23 NOTE — L&D DELIVERY NOTE
Delivery Summary    CNM Delivery Note       Patient: Prashanth Calles MRN: [id number]  SSN: [id number]    YOB: 1980  Age: 43 y.o. Sex: female        I was called at pt's bedside at 11:40 am due to pt being 10 cm dilated. Maternal pushing was initiated with this provider. Good maternal pushing is seen.  of a live baby girl  at 11:59 am with Apgars 9/9 in SHARRON position. Pt had  epidural anesthesia, and delivered in semi fowlers position. Shoulders spontaneous, easily delivered with maternal effort. Vigorous infant placed on maternal abdomen immediately following delivery. Placenta and membranes spontaneous, intact, with 3 vessel cord via Sim Mickie mechanism at 12:10. Fundus massaged to firm. Estimated blood loss 300  mL. Vagina and perineum inspected. Perineum intact. Mother and infant stable, bonding, establishing breastfeeding. Cytotec 800 mcg rectally is ordered to be given for prophylaxis due to hx of multigravida.             Information for the patient's :  Genaro Redman [[id number]]     Labor Events:    Labor: No    Steroids: None   Cervical Ripening Date/Time:       Cervical Ripening Type: None   Antibiotics During Labor: Yes   Rupture Identifier:      Rupture Date/Time: 2023 5:44 AM   Rupture Type: AROM   Amniotic Fluid Volume:      Amniotic Fluid Description: Clear;Meconium    Amniotic Fluid Odor:      Induction: None       Induction Date/Time:        Indications for Induction:      Augmentation: AROM   Augmentation Date/Time: 35:44 AM   Indications for Augmentation: Ineffective Contraction Pattern   Labor complications: None       Additional complications:        Delivery Events:  Indications For Episiotomy:     Episiotomy: None   Perineal Laceration(s): None   Repaired:     Periurethral Laceration Location:      Repaired:     Labial Laceration Location:     Repaired:     Sulcal Laceration Location:     Repaired:     Vaginal Laceration Location:     Repaired:     Cervical Laceration Location:     Repaired:     Repair Suture: None   Number of Repair Packets: 0   Estimated Blood Loss (ml): 300ml   Quantitative Blood Loss (ml)                Delivery Date: 2023    Delivery Time: 11:59 AM  Delivery Type: , Spontaneous  Sex:  Female    Gestational Age: 38w3d   Delivery Clinician:  Cait Jackman  Living Status: Living   Delivery Location: L&D            APGARS  One minute Five minutes Ten minutes   Skin color: 1   1        Heart rate: 2   2        Grimace: 2   2        Muscle tone: 2   2        Breathin   2        Totals: 9   9            Presentation: Vertex    Position: Right Occiput Anterior  Resuscitation Method:  None     Meconium Stained: None      Cord Information: 3 Vessels  Complications: Nuchal Cord Without Compressions  Cord around: head  Delayed cord clamping? Yes  Cord clamped date/time:2023 12:01 PM  Disposition of Cord Blood: Discard    Blood Gases Sent?: No    Placenta:  Date/Time: 2023 12:10 PM  Removal: Spontaneous      Appearance: Normal     [place] Measurements:  Birth Weight: 3.73 kg      Birth Length: 52.1 cm      Head Circumference: 36 cm      Chest Circumference: 34 cm     Abdominal Girth: 31 cm    Other Providers:   Lupe ELIAS;Sera BYNUM, TANNER HERRERA;AJIT DONG JENNIFER Roland Fling, Primary Nurse;Primary [place] Nurse;Charge Nurse;Midwife;Nursery Nurse;Primary Nurse         Group B Strep:   Lab Results   Component Value Date/Time    GrBStrep, External positive 2022 12:00 AM     Information for the patient's :  Yue Griffiths [[id number]]   No results found for: ABORH, PCTABR, PCTDIG, BILI, ABORHEXT, ABORH   No results for input(s): PCO2CB, PO2CB, HCO3I, SO2I, IBD, PTEMPI, SPECTI, PHICB, ISITE, IDEV, IALLEN in the last 72 hours.

## 2023-01-23 NOTE — PROGRESS NOTES
Spiritual Care Assessment/Progress Note  [address]      NAME: Scot Cevallos      MRN: [id number]  AGE: 43 y.o. SEX: female  Baptism Affiliation: No preference   Language: English     1/23/2023     Total Time (in minutes): 16     Spiritual Assessment begun in [facility] through conversation with:         []Patient        [] Family    [] Friend(s)        Reason for Consult: Initial visit     Spiritual beliefs: (Please include comment if needed)     [] Identifies with a samina tradition:         [] Supported by a samina community:            [] Claims no spiritual orientation:           [] Seeking spiritual identity:                [] Adheres to an individual form of spirituality:           [x] Not able to assess:                           Identified resources for coping:      [] Prayer                               [] Music                  [] Guided Imagery     [] Family/friends                 [] Pet visits     [] Devotional reading                          Unknown     [] Other:                                               Interventions offered during this visit: (See comments for more details)                Plan of Care:     [x] Support spiritual and/or cultural needs    [] Support AMD and/or advance care planning process      [x] Support grieving process   [] Coordinate Rites and/or Rituals    [] Coordination with community clergy   [] No spiritual needs identified at this time   [] Detailed Plan of Care below (See Comments)  [] Make referral to Music Therapy  [] Make referral to Pet Therapy     [] Make referral to Addiction services  [] Make referral to [facility]  [] Make referral to Spiritual Care Partner  [] No future visits requested        [] Contact Spiritual Care for further referrals     Comments: In-basket request: Per discussion with RN, patient resting and request for visit another time. Please contact spiritual care for future services.      [address] Amelia Evans, [address] [place] [address] paging Service [phone]

## 2023-01-23 NOTE — ROUTINE PROCESS
Bedside and Verbal shift change report given to Sameer Suero RN (oncoming nurse) by DM Obrien RN (offgoing nurse). Report included the following information SBAR, Kardex, Intake/Output, and MAR.

## 2023-01-23 NOTE — LACTATION NOTE
This note was copied from a baby's chart. Rounding for [facility] visit. [employer] at bedside, will return.

## 2023-01-23 NOTE — PROGRESS NOTES
1/23/23  4:11 PM    CM met with BRIDGETTE to complete initial evaluation and start discharge planning. Confirmed charted demographics. BRIDGETTE resides with JERSON (Peg Slight [phone]) with their five children. BRIDGETTE has adequate time off to care for the baby and does not have to return to work until May 2023. 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 BRIDGETTE plans to breast feed and has a pump for use. They have all of the needed supplies to care for the baby including a car seat, bassinet, clothing and diapers. BRIDGETTE plans to use Dr. Josiah Riedel with [address] as the baby's pediatrician. She will make an appointment once she is certain of the discharge date. BRIDGETTE has [employer] and is aware of how to obtain insurance for the baby. CM will continue to follow for dc needs    [facility] Management Interventions  Mode of Transport at Discharge:  Other (see comment) (family will transport at [employer])  Transition of Care Consult (CM Consult): Discharge Planning  Support Systems: Spouse/Significant Other  Confirm Follow Up Transport: Family  Discharge Location  Patient Expects to be Discharged to[de-identified] Home with family assistance

## 2023-01-23 NOTE — ROUTINE PROCESS
Bedside and Verbal shift change report given to Lillie Metzger RN (oncoming nurse) by Juana Vieira RN (offgoing nurse). Report included the following information SBAR, Kardex, Intake/Output, and MAR.

## 2023-01-23 NOTE — LACTATION NOTE
This note was copied from a baby's chart. Mom states breastfeeding is going well and she is nursing to early cues of hunger. Reviewed normal  behaviors and output expectations as well as feeding on demand to early cues. Discussed importance of early and frequent breast stimulation to promote lactogenesis. Baby in nursery for bath and didn't see baby at breast.  Mom encouraged to call next feed. Discussed with mother her plan for feeding. Reviewed the benefits of exclusive breast milk feeding during the hospital stay. Informed her of the risks of using formula to supplement in the first few days of life as well as the benefits of successful breast milk feeding; referred her to the Breastfeeding booklet about this information. She acknowledges understanding of information reviewed and states that it is her plan to blendfeed her infant. Will support her choice and offer additional information as needed. Pt chooses to do both breast and bottle. Discussed effects of early supplementation on breastfeeding success; may decrease breastmilk production and supply, increase risk for pathological engorgement, baby may develop preference for faster flow from bottles vs breast, and baby's stomach can be stretched if larger volumes of formula are given. Pt will successfully establish breastfeeding by feeding in response to early feeding cues   or wake every 3h, will obtain deep latch, and will keep log of feedings/output. Taught to BF at hunger cues and or q 2-3 hrs and to offer 10-20 drops of hand expressed colostrum at any non-feeds.       Breast Assessment  Left Breast:  (deferred for family in room)  Breast- Feeding Assessment  Breast-Feeding Experience: Yes (BF at least 2 previous children, now 14 and 18)  Breast Trauma/Surgery: No  Type/Quality: Good (per mother)  Lactation Consultant Visits  Breast-Feedings:  (baby in nursery for bath)  Mother/Infant Observation  Mother Observation: Breast comfortable, Recognizes feeding cues  Infant Observation:  (infant getting bath)

## 2023-01-24 VITALS
HEIGHT: 65 IN | WEIGHT: 190 LBS | OXYGEN SATURATION: 96 % | TEMPERATURE: 98.1 F | DIASTOLIC BLOOD PRESSURE: 62 MMHG | SYSTOLIC BLOOD PRESSURE: 101 MMHG | RESPIRATION RATE: 16 BRPM | BODY MASS INDEX: 31.65 KG/M2 | HEART RATE: 70 BPM

## 2023-01-24 PROBLEM — O34.219 VAGINAL BIRTH AFTER CESAREAN (VBAC): Status: ACTIVE | Noted: 2023-01-24

## 2023-01-24 PROCEDURE — 74011250637 HC RX REV CODE- 250/637: Performed by: ADVANCED PRACTICE MIDWIFE

## 2023-01-24 RX ORDER — IBUPROFEN 600 MG/1
600 TABLET ORAL
Qty: 30 TABLET | Refills: 0 | Status: SHIPPED | OUTPATIENT
Start: 2023-01-24 | End: 2023-02-01

## 2023-01-24 RX ADMIN — ACETAMINOPHEN 1000 MG: 500 TABLET ORAL at 06:24

## 2023-01-24 RX ADMIN — ACETAMINOPHEN 1000 MG: 500 TABLET ORAL at 00:21

## 2023-01-24 RX ADMIN — IBUPROFEN 600 MG: 600 TABLET, FILM COATED ORAL at 04:17

## 2023-01-24 RX ADMIN — IBUPROFEN 600 MG: 600 TABLET, FILM COATED ORAL at 11:00

## 2023-01-24 NOTE — PROGRESS NOTES
Patient signed hard copy of discharge instructions due to electronic signature pad not working. Pt off unit in stable condition via wheelchair with volunteers for discharge home per Dr. Sathya Rowley. Pt is aware to follow-up in 6 weeks. Prescriptions sent to pharmacy. Pt denies any HA, dizziness, N/V or pain at this time. Infant in car seat and discharged with mother.

## 2023-01-24 NOTE — ROUTINE PROCESS
Bedside and Verbal shift change report given to Lesly Mccormick RN (oncoming nurse) by Apurva Phillips RN (offgoing nurse). Report included the following information SBAR, Kardex, Intake/Output, and MAR.

## 2023-01-24 NOTE — DISCHARGE SUMMARY
.  Obstetrical Discharge Summary     Name: Akin Carolina MRN: [id number]  SSN: [id number]    YOB: 1980  Age: 43 y.o. Sex: female      Admit Date: 2023    Discharge Date: 2023     Admitting Physician: Jaqui Reveles MD     Attending Physician:  Willy Pham MD     Admission Diagnoses: Pregnancy [Z34.90]    Discharge Diagnoses:   Information for the patient's :  Raissa Carter [[id number]]   Delivery of a 3.73 kg female infant via [address], Spontaneous on 2023 at 11:59 AM  by Jannette Valdes. Apgars were 9  and 9 . Additional Diagnoses:   Hospital Problems  Never Reviewed            Codes Class Noted POA    Vaginal birth after  () ICD-10-CM: O34.219  ICD-9-CM: 654.21  2023 No          Lab Results   Component Value Date/Time    Rubella, External immune 2022 12:00 AM    GrBStrep, External positive 2022 12:00 AM       Hospital Course: Normal hospital course following the delivery. Patient Instructions:   Current Discharge Medication List        CONTINUE these medications which have CHANGED    Details   ibuprofen (MOTRIN) 600 mg tablet Take 1 Tablet by mouth every six (6) hours as needed for Pain for up to 8 days. Qty: 30 Tablet, Refills: 0           CONTINUE these medications which have NOT CHANGED    Details   ondansetron hcl (Zofran) 4 mg tablet Take 4 mg by mouth every eight (8) hours as needed for Nausea or Vomiting. Indications: excessive vomiting in pregnancy      folic acid 024 mcg tablet Take 400 mcg by mouth daily. PNV Comb #2-Iron-Omega 3-FA [id number] mg cmpk Take  by mouth. Disposition at Discharge: Home or self care    Condition at Discharge: Stable    Reference my discharge instructions.     Follow-up Appointments   Procedures    FOLLOW UP VISIT Appointment in: 6 Weeks     Standing Status:   Standing     Number of Occurrences:   1     Order Specific Question:   Appointment in     Answer:   6 Weeks        Signed By:  Giacomo Mendoza MD     January 24, 2023

## 2023-01-24 NOTE — LACTATION NOTE
This note was copied from a baby's chart. Mom latching baby independently and without discomfort. States feeds are going well and she is feeding baby on demand to early cues of hunger. Output meets expectations and weight loss WNL. Engorgement precautions reviewed. Manual pump to bedside per request.  Dyad for discharge today. Chart shows numerous feedings, void, stool WNL. Discussed importance of monitoring outputs and feedings on first week of life. Discussed ways to tell if baby is  getting enough breast milk, ie  voids and stools, change in color of stool, and return to birth wt within 2 weeks. Follow up with pediatrician visit for weight check in 1-2 days (per AAP guidelines.)  Encouraged to call Warm Line  [phone]  for any questions/problems that arise. Mother also given breastfeeding support group dates and times for any future needs. Engorgement Care Guidelines:  Reviewed how milk is made and normal phases of milk production. Taught care of engorged breasts - frequent breastfeeding encouraged, cool packs and motrin as tolerated. Anticipatory guidance shared. Pt will successfully establish breastfeeding by feeding in response to early feeding cues   or wake every 3h, will obtain deep latch, and will keep log of feedings/output. Taught to BF at hunger cues and or q 2-3 hrs and to offer 10-20 drops of hand expressed colostrum at any non-feeds.       Breast Assessment  Left Breast: Medium, Large  Left Nipple: Everted, Intact  Right Breast: Medium, Large  Right Nipple: Everted, Intact  Breast- Feeding Assessment  Breast-Feeding Experience: Yes (BF at least 2 previous children, now 14 and 18)  Breast Trauma/Surgery: No  Type/Quality: Good (per mother)  Lactation Consultant Visits  Breast-Feedings: Good  (per mother)  Mother/Infant Observation  Mother Observation: Breast comfortable, Close hold, Holds breast, Lets baby end feeding, Nipple round on release, Recognizes feeding cues, Alignment  Infant Observation: Opens mouth, Lips flanged, upper, Lips flanged, lower, Breast tissue moves, Feeding cues, Audible swallows, Relaxed after feeding  LATCH Documentation  Latch: Grasps breast, tongue down, lips flanged, rhythmic sucking  Audible Swallowing: A few with stimulation  Type of Nipple: Everted (after stimulation)  Comfort (Breast/Nipple): Soft/non-tender  Hold (Positioning): No assist from staff, mother able to position/hold infant  LATCH Score: 9

## 2023-08-25 NOTE — DISCHARGE INSTRUCTIONS
POSTPARTUM DISCHARGE INSTRUCTIONS       Name:  Erika Nicholson  YOB: 1980  Admission Diagnosis:  Pregnancy [Z34.90]         Delivery Type:  Vaginal Childbirth: What To Expect At Home    Your Recovery: Your body will slowly heal in the next few weeks. It is easy to get too tired and overwhelmed during the first weeks after your baby is born. Changes in your hormones can shift your mood without warning. You may find it hard to meet the extra demands on your energy and time. Take it easy on yourself. Follow-up care is a key part of your treatment and safety. Be sure to make and go to all appointments, and call your doctor if you are having problems. It's also a good idea to know your test results and keep a list of the medicines you take. How can you care for yourself at home? Vaginal bleeding and cramps  After delivery, you will have a bloody discharge from the vagina. This will turn pink within a week and then white or yellow after about 10 days. It may last for 2 to 4 weeks or longer, until the uterus has healed. Use pads instead of tampons until you stop bleeding. Do not worry if you pass some blood clots, as long as they are smaller than a golf ball. If you have a tear or stitches in your vaginal area, change the pad at least every 4 hours to prevent soreness and infection. You may have cramps for the first few days after childbirth. These are normal and occur as the uterus shrinks to normal size. Take an over-the-counter pain medicine, such as acetaminophen (Tylenol), ibuprofen (Advil, Motrin), or naproxen (Aleve), for cramps. Read and follow all instructions on the label. Do not take aspirin, because it can cause more bleeding. Do not take acetaminophen (Tylenol) and other acetaminophen containing medications (i.e. Percocet) at the same time. Breast fullness  Your breasts may overfill (engorge) in the first few days after delivery.  To help milk flow and to relieve pain, warm Patient notified.   your breasts in the shower or by using warm, moist towels before nursing. If you are not nursing, do not put warmth on your breasts or touch your breasts. Wear a tight bra or sports bra and use ice until the fullness goes away. This usually takes 2 to 3 days. Put ice or a cold pack on your breast after nursing to reduce swelling and pain. Put a thin cloth between the ice and your skin. Activity  Eat a balanced diet. Do not try to lose weight by cutting calories. Keep taking your prenatal vitamins, or take a multivitamin. Get as much rest as you can. Try to take naps when your baby sleeps during the day. Get some exercise every day. But do not do any heavy exercise until your doctor says it is okay. Wait until you are healed (about 4 to 6 weeks) before you have sexual intercourse. Your doctor will tell you when it is okay to have sex. Talk to your doctor about birth control. You can get pregnant even before your period returns. Also, you can get pregnant while you are breast-feeding. Mental Health  Many women get the \"baby blues\" during the first few days after childbirth. You may lose sleep, feel irritable, and cry easily. You may feel happy one minute and sad the next. Hormone changes are one cause of these emotional changes. Also, the demands of a new baby, along with visits from relatives or other family needs, add to a mother's stress. The \"baby blues\" often peak around the fourth day. Then they ease up in less than 2 weeks. If your moodiness or anxiety lasts for more than 2 weeks, or if you feel like life is not worth living, you may have postpartum depression. This is different for each mother. Some mothers with serious depression may worry intensely about their infant's well-being. Others may feel distant from their child. Some mothers might even feel that they might harm their baby. A mother may have signs of paranoia, wondering if someone is watching her.   With all the changes in your life, you may not know if you are depressed. Pregnancy sometimes causes changes in how you feel that are similar to the symptoms of depression. Symptoms of depression include:  Feeling sad or hopeless and losing interest in daily activities. These are the most common symptoms of depression. Sleeping too much or not enough. Feeling tired. You may feel as if you have no energy. Eating too much or too little. POSTPARTUM SUPPORT INTERNATIONAL (PSI) offers a Warm line; Chat with the Expert phone sessions; Information and Articles about Pregnancy and Postpartum Mood Disorders; Comprehensive List of Free Support Groups; Knowledgeable local coordinators who will offer support, information, and resources; Guide to Resources on [employer]; Calendar of events in the  mood disorders community; Latest News and Research; and [facility] [address] for [employer]. Remember - You are not alone; You are not to blame; With help, you will be well. 8-635-608-PPD(0973). WWW. POSTPARTUM. NET   Writing or talking about death, such as writing suicide notes or talking about guns, knives, or pills. Keep the numbers for these national suicide hotlines: 3-215-773-TALK (7-352.149.3343) and 0-148-VCSMQRX (9-736.384.4344). If you or someone you know talks about suicide or feeling hopeless, get help right away. Constipation and Hemorrhoids  Drink plenty of fluids, enough so that your urine is light yellow or clear like water. If you have kidney, heart, or liver disease and have to limit fluids, talk with your doctor before you increase the amount of fluids you drink. Eat plenty of fiber each day. Have a bran muffin or bran cereal for breakfast, and try eating a piece of fruit for a mid-afternoon snack. For painful, itchy hemorrhoids, put ice or a cold pack on the area several times a day for 10 minutes at a time.  Follow this by putting a warm compress on the area for another 10 to 20 minutes or by sitting in a shallow, warm bath.    When should you call for help? Call 911 anytime you think you may need emergency care. For example, call if:  You are thinking of hurting yourself, your baby, or anyone else. You passed out (lost consciousness). You have symptoms of a blood clot in your lung (called a pulmonary embolism). These may include:    Sudden chest pain. Trouble breathing. Coughing up blood. Call your doctor now or seek immediate medical care if:  You have severe vaginal bleeding. You are soaking through a pad each hour for 2 or more hours. Your vaginal bleeding seems to be getting heavier or is still bright red 4 days after delivery. You are dizzy or lightheaded, or you feel like you may faint. You are vomiting or cannot keep fluids down. You have a fever. You have new or more belly pain. You pass tissue (not just blood). Your vaginal discharge smells bad. Your belly feels tender or full and hard. Your breasts are continuously painful or red. You feel sad, anxious, or hopeless for more than a few days. You have sudden, severe pain in your belly. You have symptoms of a blood clot in your leg (called a deep vein thrombosis),          such as:  Pain in your calf, back of the knee, thigh, or groin. Redness and swelling in your leg or groin. You have symptoms of preeclampsia, such as:  Sudden swelling of your face, hands, or feet. New vision problems (such as dimness or blurring). A severe headache. Your blood pressure is higher than it should be or rises suddenly. You have new nausea or vomiting. Watch closely for changes in your health, and be sure to contact your doctor if you have any problems. Postpartum Support    PARENTS:  Are you feeling sad or depressed? Is it difficult for you to enjoy yourself? Do you feel more irritable or tense? Do you feel anxious or panicky? Are you having difficulty bonding with your baby? Do you feel as if you are \"out of control\" or \"going crazy\"?  Are you worried that you might hurt your baby or yourself? FAMILIES: Do you worry that something is wrong but don't know how to help? Do you think that your partner or spouse is having problems coping? Are you worried that it may never get better? While many women experience some mild mood change or \"the blues\" during or after the birth of a child, 1 in 9 women experience more significant symptoms of depression or anxiety. 1 in 10 Dads become depressed during the first year. Things you can do  Being a good parent includes taking care of yourself. If you take care of yourself, you will be able to take better care of your baby and your family. Talk to a counselor or healthcare provider who has training in  mood and anxiety problems. Learn as much as you can about pregnancy and postpartum depression and anxiety. Get support from family and friends. Ask for help when you need it. Join a support group in your area or online. Keep active by walking, stretching or whatever form of exercise helps you to feel better. Get enough rest and time for yourself. Eat a healthy diet. Don't give up! It may take more than one try to get the right help you need. These are general instructions for a healthy lifestyle:    No smoking/ No tobacco products/ Avoid exposure to second hand smoke    Surgeon General's Warning:  Quitting smoking now greatly reduces serious risk to your health. Obesity, smoking, and sedentary lifestyle greatly increases your risk for illness    A healthy diet, regular physical exercise & weight monitoring are important for maintaining a healthy lifestyle    Recognize signs and symptoms of STROKE:    F-face looks uneven    A-arms unable to move or move unevenly    S-speech slurred or non-existent    T-time-call 911 as soon as signs and symptoms begin - DO NOT go       back to bed or wait to see if you get better - TIME IS BRAIN.       I have had the opportunity to make my options or choices for discharge. I have received and understand these instructions.

## 2024-01-17 LAB
ABO, EXTERNAL RESULT: NORMAL
HEP B, EXTERNAL RESULT: NEGATIVE
HEPATITIS C ANTIBODY, EXTERNAL RESULT: NORMAL
HIV, EXTERNAL RESULT: NORMAL
RH FACTOR, EXTERNAL RESULT: POSITIVE
RUBELLA TITER, EXTERNAL RESULT: NORMAL
T. PALLIDUM (SYPHILIS) ANTIBODY, EXTERNAL RESULT: NORMAL

## 2024-01-18 LAB
C. TRACHOMATIS, EXTERNAL RESULT: NEGATIVE
N. GONORRHOEAE, EXTERNAL RESULT: NEGATIVE

## 2024-07-31 LAB — GBS, EXTERNAL RESULT: POSITIVE

## 2024-08-23 ENCOUNTER — HOSPITAL ENCOUNTER (INPATIENT)
Facility: HOSPITAL | Age: 44
LOS: 3 days | Discharge: HOME | End: 2024-08-26
Attending: OBSTETRICS & GYNECOLOGY | Admitting: OBSTETRICS & GYNECOLOGY
Payer: COMMERCIAL

## 2024-08-23 PROBLEM — O36.8190 DECREASED FETAL MOVEMENT AFFECTING MANAGEMENT OF MOTHER, ANTEPARTUM: Status: ACTIVE | Noted: 2024-08-23

## 2024-08-23 LAB
ABO + RH BLD: NORMAL
BLOOD GROUP ANTIBODIES SERPL: NORMAL
ERYTHROCYTE [DISTWIDTH] IN BLOOD BY AUTOMATED COUNT: 14.1 % (ref 11.5–14.5)
HCT VFR BLD AUTO: 32.9 % (ref 35–47)
HGB BLD-MCNC: 11 G/DL (ref 11.5–16)
MCH RBC QN AUTO: 28.4 PG (ref 26–34)
MCHC RBC AUTO-ENTMCNC: 33.4 G/DL (ref 30–36.5)
MCV RBC AUTO: 85 FL (ref 80–99)
NRBC # BLD: 0 K/UL (ref 0–0.01)
NRBC BLD-RTO: 0 PER 100 WBC
PLATELET # BLD AUTO: 228 K/UL (ref 150–400)
PMV BLD AUTO: 10.4 FL (ref 8.9–12.9)
RBC # BLD AUTO: 3.87 M/UL (ref 3.8–5.2)
SPECIMEN EXP DATE BLD: NORMAL
WBC # BLD AUTO: 9.5 K/UL (ref 3.6–11)

## 2024-08-23 PROCEDURE — 86901 BLOOD TYPING SEROLOGIC RH(D): CPT

## 2024-08-23 PROCEDURE — 59200 INSERT CERVICAL DILATOR: CPT | Performed by: ADVANCED PRACTICE MIDWIFE

## 2024-08-23 PROCEDURE — 1100000000 HC RM PRIVATE

## 2024-08-23 PROCEDURE — 36415 COLL VENOUS BLD VENIPUNCTURE: CPT

## 2024-08-23 PROCEDURE — 6360000002 HC RX W HCPCS: Performed by: OBSTETRICS & GYNECOLOGY

## 2024-08-23 PROCEDURE — 86900 BLOOD TYPING SEROLOGIC ABO: CPT

## 2024-08-23 PROCEDURE — 86850 RBC ANTIBODY SCREEN: CPT

## 2024-08-23 PROCEDURE — 86780 TREPONEMA PALLIDUM: CPT

## 2024-08-23 PROCEDURE — 2580000003 HC RX 258: Performed by: OBSTETRICS & GYNECOLOGY

## 2024-08-23 PROCEDURE — 85027 COMPLETE CBC AUTOMATED: CPT

## 2024-08-23 PROCEDURE — 7210000100 HC LABOR FEE PER 1 HR: Performed by: ADVANCED PRACTICE MIDWIFE

## 2024-08-23 RX ORDER — TRANEXAMIC ACID 10 MG/ML
1000 INJECTION, SOLUTION INTRAVENOUS
Status: DISCONTINUED | OUTPATIENT
Start: 2024-08-23 | End: 2024-08-24

## 2024-08-23 RX ORDER — METHYLERGONOVINE MALEATE 0.2 MG/ML
200 INJECTION INTRAVENOUS PRN
Status: DISCONTINUED | OUTPATIENT
Start: 2024-08-23 | End: 2024-08-24

## 2024-08-23 RX ORDER — ONDANSETRON 4 MG/1
4 TABLET, ORALLY DISINTEGRATING ORAL EVERY 6 HOURS PRN
Status: DISCONTINUED | OUTPATIENT
Start: 2024-08-23 | End: 2024-08-24

## 2024-08-23 RX ORDER — TERBUTALINE SULFATE 1 MG/ML
0.25 INJECTION, SOLUTION SUBCUTANEOUS
Status: DISCONTINUED | OUTPATIENT
Start: 2024-08-23 | End: 2024-08-24

## 2024-08-23 RX ORDER — SODIUM CHLORIDE 0.9 % (FLUSH) 0.9 %
5-40 SYRINGE (ML) INJECTION EVERY 12 HOURS SCHEDULED
Status: DISCONTINUED | OUTPATIENT
Start: 2024-08-23 | End: 2024-08-24

## 2024-08-23 RX ORDER — CARBOPROST TROMETHAMINE 250 UG/ML
250 INJECTION, SOLUTION INTRAMUSCULAR PRN
Status: DISCONTINUED | OUTPATIENT
Start: 2024-08-23 | End: 2024-08-24

## 2024-08-23 RX ORDER — ONDANSETRON 2 MG/ML
4 INJECTION INTRAMUSCULAR; INTRAVENOUS EVERY 6 HOURS PRN
Status: DISCONTINUED | OUTPATIENT
Start: 2024-08-23 | End: 2024-08-24

## 2024-08-23 RX ORDER — SODIUM CHLORIDE, SODIUM LACTATE, POTASSIUM CHLORIDE, AND CALCIUM CHLORIDE .6; .31; .03; .02 G/100ML; G/100ML; G/100ML; G/100ML
500 INJECTION, SOLUTION INTRAVENOUS PRN
Status: DISCONTINUED | OUTPATIENT
Start: 2024-08-23 | End: 2024-08-24

## 2024-08-23 RX ORDER — SODIUM CHLORIDE 0.9 % (FLUSH) 0.9 %
5-40 SYRINGE (ML) INJECTION PRN
Status: DISCONTINUED | OUTPATIENT
Start: 2024-08-23 | End: 2024-08-24

## 2024-08-23 RX ORDER — SODIUM CHLORIDE 9 MG/ML
25 INJECTION, SOLUTION INTRAVENOUS PRN
Status: DISCONTINUED | OUTPATIENT
Start: 2024-08-23 | End: 2024-08-24

## 2024-08-23 RX ORDER — SODIUM CHLORIDE, SODIUM LACTATE, POTASSIUM CHLORIDE, CALCIUM CHLORIDE 600; 310; 30; 20 MG/100ML; MG/100ML; MG/100ML; MG/100ML
INJECTION, SOLUTION INTRAVENOUS CONTINUOUS
Status: DISCONTINUED | OUTPATIENT
Start: 2024-08-23 | End: 2024-08-24

## 2024-08-23 RX ORDER — MISOPROSTOL 200 UG/1
400 TABLET ORAL PRN
Status: DISCONTINUED | OUTPATIENT
Start: 2024-08-23 | End: 2024-08-24

## 2024-08-23 RX ORDER — DOCUSATE SODIUM 100 MG/1
100 CAPSULE, LIQUID FILLED ORAL 2 TIMES DAILY
Status: DISCONTINUED | OUTPATIENT
Start: 2024-08-23 | End: 2024-08-24

## 2024-08-23 RX ADMIN — SODIUM CHLORIDE 2.5 MILLION UNITS: 9 INJECTION, SOLUTION INTRAVENOUS at 23:58

## 2024-08-23 RX ADMIN — PENICILLIN G POTASSIUM 5 MILLION UNITS: 5000000 INJECTION, POWDER, FOR SOLUTION INTRAMUSCULAR; INTRAVENOUS at 16:03

## 2024-08-23 RX ADMIN — SODIUM CHLORIDE 2.5 MILLION UNITS: 9 INJECTION, SOLUTION INTRAVENOUS at 20:01

## 2024-08-23 RX ADMIN — SODIUM CHLORIDE, POTASSIUM CHLORIDE, SODIUM LACTATE AND CALCIUM CHLORIDE: 600; 310; 30; 20 INJECTION, SOLUTION INTRAVENOUS at 16:02

## 2024-08-23 NOTE — PROGRESS NOTES
1900: Assumed care of patient. Patient denies any HA, RUQ pain, N/V, vision changes, vaginal bleeding, leakage of fluids. Patients endorses some fetal movement.     2028: Valadez bulb out at this time.     2145: Bhavesh MILLAN at bedside discussing plan of care.    0250: Bhavesh LOVE at bedside discussing plan of care. Patient consents to SVE. Exam 3/50/-3.  Plan to start low dose pitocin at this time per Bhavesh LOVE.     0317: VORB to start Pitocin at 2 units/min received from KATE Ospina.

## 2024-08-23 NOTE — PLAN OF CARE
Problem: Vaginal Birth or  Section  Goal: Fetal and maternal status remain reassuring during the birth process  Description:  Birth OB-Pregnancy care plan goal which identifies if the fetal and maternal status remain reassuring during the birth process  2024 by Mehdi Banks RN  Outcome: Progressing  2024 1504 by Rajwinder Banks RN  Outcome: Progressing  2024 1503 by Rajwinder Banks RN  Outcome: Progressing     Problem: Pain  Goal: Verbalizes/displays adequate comfort level or baseline comfort level  2024 by Mehdi Banks RN  Outcome: Progressing  2024 1504 by Rajwinder Banks RN  Outcome: Progressing  2024 1503 by Rajwinder Banks RN  Outcome: Progressing     Problem: Safety - Adult  Goal: Free from fall injury  2024 by Mehdi Banks RN  Outcome: Progressing  2024 1504 by Rajwinder Banks RN  Outcome: Progressing     Problem: Infection - Adult  Goal: Absence of infection during hospitalization  2024 by Mehdi Banks RN  Outcome: Progressing  2024 1504 by Rajwinder Banks RN  Outcome: Progressing

## 2024-08-23 NOTE — PROGRESS NOTES
1416 Patient arrived on unit for decreased fetal movement since last night.  EFM placed, IV started, labs sent.  Vital signs stable.  No c/o pain other than regular aches.  Endorses bright red spotting since this AM, noticed when she wipes after voiding.  No other complaints.      1439 Dr. Villalba at bedside. Fluid bolus started per MD.

## 2024-08-23 NOTE — PROGRESS NOTES
1448: verbal report received from MEGAN Shrestha RN.    1450: Orly SEYMOUR at bedside with ultrasound. Confirmed vertex. MD discussing with pt plan to monitor EFM and will discuss induction once EFM improves.     1542: Orly SEYMOUR at bedside. SVE 2/50/-3.     1543: FB placed 80/60 by Orly SEYMOUR. Verbal order pt may eat.     1900: Bedside and Verbal shift change report given to Mehdi RN (oncoming nurse) by SALOME Banks RN (offgoing nurse). Report included the following information Nurse Handoff Report, Adult Overview, Intake/Output, MAR, Recent Results, and Med Rec Status.

## 2024-08-23 NOTE — H&P
History & Physical    Name: Jun Avina MRN: [id number]  SSN: [id number]    YOB: 1980  Age: 43 y.o.  Sex: female      Subjective:     Chief Complaint:  Pregnancy and decreased movement at term    Estimated Date of Delivery: 24  OB History    Para Term  AB Living   8   6   0 5   SAB IAB Ectopic Molar Multiple Live Births                    # Outcome Date GA Lbr Fox/2nd Weight Sex Type Anes PTL Lv   1 Current                Ms. Avina is admitted with pregnancy at 40w0d for induction of labor due to decreased fetal movement. 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  Please see prenatal records which have also been sent to Labor and Delivery and added to Epic for details.    Past Medical History:   Diagnosis Date    Anemia     Asthma     exercise induced    Herpes simplex virus (HSV) infection     oral    Postpartum depression      Past Surgical History:   Procedure Laterality Date     SECTION      LEEP N/A     OTHER SURGICAL HISTORY Right     SHOULDER     Social History     Occupational History    Not on file   Tobacco Use    Smoking status: Never    Smokeless tobacco: Never   Substance and Sexual Activity    Alcohol use: Not on file    Drug use: Not on file    Sexual activity: Not on file     Family History   Problem Relation Age of Onset    Hypertension Father     Cancer Father     Cancer Sister     Diabetes Father        No Known Allergies  Prior to Admission medications    Medication Sig Start Date End Date Taking? Authorizing Provider   folic acid (FOLVITE) 400 MCG tablet Take 400 mcg by mouth daily    Automatic Reconciliation, Ar   ondansetron (ZOFRAN) 4 MG tablet Take 4 mg by mouth every 8 hours as needed    Automatic Reconciliation, Ar        Review of Systems: A comprehensive review of systems was negative except for that written in the History of Present  Illness.    Objective:     Vitals:  Vitals:    24 1435 24 1438 24 1443 24 1447   BP: 109/69      Pulse: 80      Resp: 16      Temp: 97.9 °F (36.6 °C)      TempSrc: Oral      SpO2: 98% 98% 97%    Weight:    94.3 kg (208 lb)   Height:    1.651 m (5' 5\")         /69   Pulse 80   Temp 97.9 °F (36.6 °C) (Oral)   Resp 16   Ht 1.651 m (5' 5\")   Wt 94.3 kg (208 lb)   SpO2 97%   BMI 34.61 kg/m²       Lungs:   Respiratory non labored   Heart:   No clubbing, cyanosis or edema   Abdomen:  Gravid   Presentations: Cephalic by bedside US; BPP 8/8 MVP 8   Cervix:     Dilation: 2cm    Effacement: 50%    Station:  -3       Impression/Plan:     Principal Problem:    Labor and delivery indication for care or intervention  Active Problems:    Herpes simplex virus (HSV) infection  Resolved Problems:    * No resolved hospital problems. *       Plan: Admit for induction of labor. Group B Strep positive, use of prophylactic antibiotics indicated.    She presented for decreased fetal movement over the last day. Discussed that as she is term and close to her due date, would recommend delivery.  She accepts.    She did initially have moderate variability without decels/accels. She received IVF bolus and subsequently had reactive fetal tracing. Reviewed potential need for  if the baby does not tolerate labor. She does want to try for labor, understands risk of TOLAC including 1%uterine rupture leading to fetal/maternal compromise, blood loss, infection, etc.

## 2024-08-24 ENCOUNTER — ANESTHESIA EVENT (OUTPATIENT)
Facility: HOSPITAL | Age: 44
End: 2024-08-24
Payer: COMMERCIAL

## 2024-08-24 ENCOUNTER — ANESTHESIA (OUTPATIENT)
Facility: HOSPITAL | Age: 44
End: 2024-08-24
Payer: COMMERCIAL

## 2024-08-24 LAB
ALBUMIN SERPL-MCNC: 2.6 G/DL (ref 3.5–5)
ALBUMIN/GLOB SERPL: 0.8 (ref 1.1–2.2)
ALP SERPL-CCNC: 143 U/L (ref 45–117)
ALT SERPL-CCNC: 20 U/L (ref 12–78)
ANION GAP SERPL CALC-SCNC: 9 MMOL/L (ref 5–15)
AST SERPL-CCNC: 27 U/L (ref 15–37)
BILIRUB SERPL-MCNC: 0.5 MG/DL (ref 0.2–1)
BUN SERPL-MCNC: 2 MG/DL (ref 6–20)
BUN/CREAT SERPL: 3 (ref 12–20)
CALCIUM SERPL-MCNC: 8.5 MG/DL (ref 8.5–10.1)
CHLORIDE SERPL-SCNC: 103 MMOL/L (ref 97–108)
CO2 SERPL-SCNC: 24 MMOL/L (ref 21–32)
COMMENT:: NORMAL
CREAT SERPL-MCNC: 0.59 MG/DL (ref 0.55–1.02)
GLOBULIN SER CALC-MCNC: 3.4 G/DL (ref 2–4)
GLUCOSE SERPL-MCNC: 90 MG/DL (ref 65–100)
POTASSIUM SERPL-SCNC: 3.8 MMOL/L (ref 3.5–5.1)
PROT SERPL-MCNC: 6 G/DL (ref 6.4–8.2)
SODIUM SERPL-SCNC: 136 MMOL/L (ref 136–145)
SPECIMEN HOLD: NORMAL

## 2024-08-24 PROCEDURE — 6370000000 HC RX 637 (ALT 250 FOR IP): Performed by: ADVANCED PRACTICE MIDWIFE

## 2024-08-24 PROCEDURE — 36415 COLL VENOUS BLD VENIPUNCTURE: CPT

## 2024-08-24 PROCEDURE — 51702 INSERT TEMP BLADDER CATH: CPT

## 2024-08-24 PROCEDURE — 7220000101 HC DELIVERY VAGINAL/SINGLE: Performed by: ADVANCED PRACTICE MIDWIFE

## 2024-08-24 PROCEDURE — 80053 COMPREHEN METABOLIC PANEL: CPT

## 2024-08-24 PROCEDURE — 6360000002 HC RX W HCPCS: Performed by: ANESTHESIOLOGY

## 2024-08-24 PROCEDURE — 82962 GLUCOSE BLOOD TEST: CPT

## 2024-08-24 PROCEDURE — 3700000025 EPIDURAL BLOCK: Performed by: ANESTHESIOLOGY

## 2024-08-24 PROCEDURE — 3700000156 HC EPIDURAL ANESTHESIA: Performed by: ANESTHESIOLOGY

## 2024-08-24 PROCEDURE — 4A1HXCZ MONITORING OF PRODUCTS OF CONCEPTION, CARDIAC RATE, EXTERNAL APPROACH: ICD-10-PCS | Performed by: OBSTETRICS & GYNECOLOGY

## 2024-08-24 PROCEDURE — 7210000100 HC LABOR FEE PER 1 HR: Performed by: ADVANCED PRACTICE MIDWIFE

## 2024-08-24 PROCEDURE — 2580000003 HC RX 258: Performed by: OBSTETRICS & GYNECOLOGY

## 2024-08-24 PROCEDURE — 2580000003 HC RX 258

## 2024-08-24 PROCEDURE — 6360000002 HC RX W HCPCS: Performed by: OBSTETRICS & GYNECOLOGY

## 2024-08-24 PROCEDURE — 10907ZC DRAINAGE OF AMNIOTIC FLUID, THERAPEUTIC FROM PRODUCTS OF CONCEPTION, VIA NATURAL OR ARTIFICIAL OPENING: ICD-10-PCS | Performed by: OBSTETRICS & GYNECOLOGY

## 2024-08-24 PROCEDURE — 1120000000 HC RM PRIVATE OB

## 2024-08-24 PROCEDURE — 00HU33Z INSERTION OF INFUSION DEVICE INTO SPINAL CANAL, PERCUTANEOUS APPROACH: ICD-10-PCS | Performed by: OBSTETRICS & GYNECOLOGY

## 2024-08-24 PROCEDURE — 94761 N-INVAS EAR/PLS OXIMETRY MLT: CPT

## 2024-08-24 PROCEDURE — 6360000002 HC RX W HCPCS

## 2024-08-24 RX ORDER — MISOPROSTOL 200 UG/1
200 TABLET ORAL ONCE
Status: COMPLETED | OUTPATIENT
Start: 2024-08-24 | End: 2024-08-24

## 2024-08-24 RX ORDER — SODIUM CHLORIDE 9 MG/ML
INJECTION, SOLUTION INTRAVENOUS PRN
Status: DISCONTINUED | OUTPATIENT
Start: 2024-08-24 | End: 2024-08-26 | Stop reason: HOSPADM

## 2024-08-24 RX ORDER — LANOLIN/MINERAL OIL
LOTION (ML) TOPICAL PRN
Status: DISCONTINUED | OUTPATIENT
Start: 2024-08-24 | End: 2024-08-26 | Stop reason: HOSPADM

## 2024-08-24 RX ORDER — NALOXONE HYDROCHLORIDE 0.4 MG/ML
INJECTION, SOLUTION INTRAMUSCULAR; INTRAVENOUS; SUBCUTANEOUS PRN
Status: DISCONTINUED | OUTPATIENT
Start: 2024-08-24 | End: 2024-08-26 | Stop reason: HOSPADM

## 2024-08-24 RX ORDER — IBUPROFEN 800 MG/1
800 TABLET, FILM COATED ORAL EVERY 8 HOURS SCHEDULED
Status: DISCONTINUED | OUTPATIENT
Start: 2024-08-24 | End: 2024-08-26 | Stop reason: HOSPADM

## 2024-08-24 RX ORDER — SODIUM CHLORIDE 0.9 % (FLUSH) 0.9 %
5-40 SYRINGE (ML) INJECTION EVERY 12 HOURS SCHEDULED
Status: DISCONTINUED | OUTPATIENT
Start: 2024-08-24 | End: 2024-08-26 | Stop reason: HOSPADM

## 2024-08-24 RX ORDER — FENTANYL/BUPIVACAINE/NS/PF 2-1250MCG
10 PLASTIC BAG, INJECTION (ML) INJECTION CONTINUOUS
Status: DISCONTINUED | OUTPATIENT
Start: 2024-08-24 | End: 2024-08-26 | Stop reason: HOSPADM

## 2024-08-24 RX ORDER — SODIUM CHLORIDE 0.9 % (FLUSH) 0.9 %
5-40 SYRINGE (ML) INJECTION PRN
Status: DISCONTINUED | OUTPATIENT
Start: 2024-08-24 | End: 2024-08-26 | Stop reason: HOSPADM

## 2024-08-24 RX ORDER — ONDANSETRON 2 MG/ML
4 INJECTION INTRAMUSCULAR; INTRAVENOUS EVERY 6 HOURS PRN
Status: DISCONTINUED | OUTPATIENT
Start: 2024-08-24 | End: 2024-08-26 | Stop reason: HOSPADM

## 2024-08-24 RX ORDER — ONDANSETRON 2 MG/ML
4 INJECTION INTRAMUSCULAR; INTRAVENOUS EVERY 6 HOURS PRN
Status: DISCONTINUED | OUTPATIENT
Start: 2024-08-24 | End: 2024-08-24

## 2024-08-24 RX ORDER — ONDANSETRON 4 MG/1
4 TABLET, ORALLY DISINTEGRATING ORAL EVERY 6 HOURS PRN
Status: DISCONTINUED | OUTPATIENT
Start: 2024-08-24 | End: 2024-08-26 | Stop reason: HOSPADM

## 2024-08-24 RX ORDER — ACETAMINOPHEN 500 MG
1000 TABLET ORAL EVERY 8 HOURS SCHEDULED
Status: DISCONTINUED | OUTPATIENT
Start: 2024-08-24 | End: 2024-08-26 | Stop reason: HOSPADM

## 2024-08-24 RX ORDER — DOCUSATE SODIUM 100 MG/1
100 CAPSULE, LIQUID FILLED ORAL 2 TIMES DAILY
Status: DISCONTINUED | OUTPATIENT
Start: 2024-08-24 | End: 2024-08-26 | Stop reason: HOSPADM

## 2024-08-24 RX ADMIN — OXYTOCIN 999 MILLI-UNITS/MIN: 10 INJECTION, SOLUTION INTRAMUSCULAR; INTRAVENOUS at 14:59

## 2024-08-24 RX ADMIN — SODIUM CHLORIDE, POTASSIUM CHLORIDE, SODIUM LACTATE AND CALCIUM CHLORIDE: 600; 310; 30; 20 INJECTION, SOLUTION INTRAVENOUS at 14:05

## 2024-08-24 RX ADMIN — SODIUM CHLORIDE 2.5 MILLION UNITS: 9 INJECTION, SOLUTION INTRAVENOUS at 03:33

## 2024-08-24 RX ADMIN — SODIUM CHLORIDE 2.5 MILLION UNITS: 9 INJECTION, SOLUTION INTRAVENOUS at 11:57

## 2024-08-24 RX ADMIN — ONDANSETRON 4 MG: 2 INJECTION INTRAMUSCULAR; INTRAVENOUS at 17:05

## 2024-08-24 RX ADMIN — ACETAMINOPHEN 1000 MG: 500 TABLET ORAL at 16:07

## 2024-08-24 RX ADMIN — OXYTOCIN 2 MILLI-UNITS/MIN: 10 INJECTION, SOLUTION INTRAMUSCULAR; INTRAVENOUS at 03:30

## 2024-08-24 RX ADMIN — ONDANSETRON 4 MG: 2 INJECTION INTRAMUSCULAR; INTRAVENOUS at 09:46

## 2024-08-24 RX ADMIN — SODIUM CHLORIDE, POTASSIUM CHLORIDE, SODIUM LACTATE AND CALCIUM CHLORIDE: 600; 310; 30; 20 INJECTION, SOLUTION INTRAVENOUS at 13:18

## 2024-08-24 RX ADMIN — MISOPROSTOL 200 MCG: 200 TABLET ORAL at 16:06

## 2024-08-24 RX ADMIN — SODIUM CHLORIDE 2.5 MILLION UNITS: 9 INJECTION, SOLUTION INTRAVENOUS at 07:53

## 2024-08-24 RX ADMIN — SODIUM CHLORIDE, POTASSIUM CHLORIDE, SODIUM LACTATE AND CALCIUM CHLORIDE: 600; 310; 30; 20 INJECTION, SOLUTION INTRAVENOUS at 09:24

## 2024-08-24 RX ADMIN — IBUPROFEN 800 MG: 800 TABLET ORAL at 19:54

## 2024-08-24 ASSESSMENT — PAIN DESCRIPTION - LOCATION: LOCATION: ABDOMEN

## 2024-08-24 ASSESSMENT — PAIN SCALES - GENERAL
PAINLEVEL_OUTOF10: 3
PAINLEVEL_OUTOF10: 0

## 2024-08-24 ASSESSMENT — PAIN DESCRIPTION - DESCRIPTORS: DESCRIPTORS: CRAMPING

## 2024-08-24 NOTE — PROGRESS NOTES
Labor Note    Jun Avina  [id number]  1980   40w0d      S: Coping well with irregular contractions s/p cook expulsion. Denies c/o at this time. Audible FM on monitor. Pt reports inability to feel FM. Denies VB/LOF.    O:    /64   Pulse 85   Temp 97.9 °F (36.6 °C) (Oral)   Resp 17   Ht 1.651 m (5' 5\")   Wt 94.3 kg (208 lb)   SpO2 98%   BMI 34.61 kg/m²        SVE: Deferred at this time.  FHT reviewed and remains CAT I  Baseline:135 BPM  Moderate variability  Accels: present  Decels: Variable, periodic  Ctx/[place]: 2-5 min, mild to palpation, resting tone soft between.    A/P:  43 y.o.  @ 40w0d admitted for induction of labor. Pregnancy course complicated by AMA, grand multiparity.  GBS+  CAT I FHT  -  CNM at bedside for pt evaluation.  Rec made by Dr. Jarrett to allow time for spontaneous labor following cook balloon expulsion. Pt agreeable.  Reviewed birth preferences and methods of augmentation to include low dose pitocin per protocol with r/b/a. Pt verbalized understanding.  Discussed patient's desire for future fertility. Encouraged to discuss with primary OB for appropriate methods for her health and preference.    - GBS + PCN ordered  - FWB:  CEFM/[place]  - Labor course Expectant management   - Pain control - plans unmedicated  - Anticipate .      Signed:    IAN Lai

## 2024-08-24 NOTE — PROGRESS NOTES
Labor Progress Note     Patient: Jun Avina MRN: [id number]  SSN: [id number]    YOB: 1980  Age: 43 y.o.  Sex: female        Subjective:   Patient evaluated and is coping well with contractions.  reports contractions starting to intensify   Objective:   Blood pressure 131/78, pulse 96, temperature 97.5 °F (36.4 °C), temperature source Oral, resp. rate 17, height 1.651 m (5' 5\"), weight 94.3 kg (208 lb), SpO2 100%.    Sterile Vaginal Exam: deferred  Membranes:  intact  EFM:  - Fetal Heart Rate: Baseline Rate: 145 bpm    - Uterine Activity: every 2-3 min   -pit: 14 mu/min        Assessment:    SIUP @ 40w1d   IOL  GBS positive  Category 1 fetal heart rate tracing   Plan:   Discussed IOL methods with patient including AROM, patient declines AROM at this time  Ambulating in room  Plans unmedicated birth   Continue with pitocin  All questions answered, and pt/partner agree with plan of care  Anticipate       Signed By:  ABRAHAM Patterson CNM      2024 9:56 AM

## 2024-08-24 NOTE — ANESTHESIA POSTPROCEDURE EVALUATION
Department of Anesthesiology  Postprocedure Note    Patient: Jun Avina  MRN: [id number]  YOB: 1980  Date of evaluation: 8/24/2024    Procedure Summary       Date: 08/24/24 Room / Location:     Anesthesia Start: 1402 Anesthesia Stop: 1455    Procedure: Labor Analgesia Diagnosis:     Scheduled Providers:  Responsible Provider: Kane Velázquez MD    Anesthesia Type: epidural ASA Status: 2            Anesthesia Type: No value filed.    Brandon Phase I:      Brandon Phase II:      Anesthesia Post Evaluation    Patient location during evaluation: PACU  Patient participation: complete - patient participated  Level of consciousness: awake  Airway patency: patent  Nausea & Vomiting: no vomiting and no nausea  Cardiovascular status: hemodynamically stable  Respiratory status: acceptable  Hydration status: stable  Pain management: adequate    No notable events documented.

## 2024-08-24 NOTE — PROGRESS NOTES
0720 Assumed care of patient. Standing up at bedside. Rates ctx pain 4/10 and tolerable. Denies H/A, visual disturbances, N/V or epigastric pain. No vaginal bleeding or leakage of fluid. No peripheral edema. Labor education provided. Family at bedside.    1100 Patient is active, standing up at bedside and sitting on birthing ball.  and family present.    1215 Flakito CNM to bedside, patient consent to SVE. AROM clear moderate fluid.    1350 Patient requesting cervical exam prior to epidural. Stephenia CNM to bedside, patient consenting to SVE.    1405 Sitting up to side of bed for epidural placement. Time out performed.    1412 Test dose.    1427 Reports feeling increased intermittent pressure. Flakito CNM to bedside performing SVE.    1447 Reports feeling increased pressure. Flakito CNM performing SVE. Complete dilation.    1447 Patient actively pushing.  RN remains in continuous attendance at the bedside.  Assessment & evaluation of fetal heart rate ongoing via continuous EFM.     1455 RN remained at bedside throughout pushing.  EFM continuously assessed.  Vaginal delivery of viable infant.     1615 CNM updated on BP readings post partum. Patient denies H/A, visual changes or new abnormal sx. New order received for labs.    1650 Ambulated to bathroom with assist, voided 500ml urine. Assisted with marimar care.    1725 CNM updated on patient. May hold off on urine pcr for now since BP's are improving. Labs reviewed. Patient may go to post partum unit.    1758 Assisted with ambulating to bathroom, voided 300ml urine.    1810 Patient appears drowsy, states she just doesn't feel herself. Neurologically intact, answers and follows commands appropriately. CNM and charge nurse made aware, to bedside to assess. Patient again answering questions appropriately, follows commands, moving all extremities, but drowsy. VS obtained and WNL. .     1845 Transfer to MIU via wheelchair, bedside report given to

## 2024-08-24 NOTE — L&D DELIVERY NOTE
Patient in semi-fowlers with urge to push. After approximately pushing for 10 min, head delivered OA. Tight nuchal cord unable to be reduced. With next push shoulders easily delivered, baby somersaulted, nuchal cord reduced and baby brought to moms chest. Baby vigorous and crying with drying/stimulation. 3vc clamped and cut when pulsing ceased. Active management of third stage of labor. Placenta delivered spontaneously and intact.  Vulva, vagina, perineum inspected and found to be intact. R periurethral abrasion not in need of repair. Fundus firm, midline, small bleeding. Baby remains skin to skin with mom. QBL:  50ml         Fabrice, Male Jun [[id number]]      Labor Events     Steroids: None  Cervical Ripening Date/Time:  24 15:43:00   Cervical Ripening Type: Valadez/EASI  Antibiotics Received during Labor: Yes  Rupture Date/Time:  24 12:15:00   Rupture Type: AROM  Fluid Color: Clear  Fluid Odor: None  Fluid Volume: Moderate  Induction: Oxytocin  Augmentation: AROM  Labor Complications: None       Anesthesia    Method: Epidural       Labor Event Times      Labor onset date/time:        Dilation complete date/time:  24 14:47:00 EDT     Start pushing date/time:  2024 14:47:00   Decision date/time (emergent ):            Delivery Details      Delivery Date: 24 Delivery Time: 14:55:00   Delivery Type: , Spontaneous               Presentation    Presentation: Vertex  _: Occiput  _: Anterior       Shoulder Dystocia    Shoulder Dystocia Present?: No       Assisted Delivery Details    Forceps Attempted?: No  Vacuum Extractor Attempted?: No                           Cord    Vessels: 3 Vessels  Complications: Nuchal Tight  Cord Around: Head  Delayed Cord Clamping?: Yes  Cord Clamped Date/Time: 2024 14:57:00  Cord Blood Disposition: Discard  Gases Sent?: No              Placenta    Date/Time: 2024 15:01:00  Removal: Expressed  Appearance: Intact  Disposition:  Family       Lacerations    Episiotomy: None       Vaginal Counts    Initial Count Personnel: KATARINA HARRIS  Initial Count Verified By: FLAKITO LOVE  Intial Sponge Count: Correct Intial Needles Count: Correct Intial Instruments Count: Correct   Final Sponges Count: Correct Final Needles  Count: Correct Final Instruments Count: Correct   Final Count Personnel: FLAKITO LOVE  Final Count Verified By: KATARINA HARRIS  Accurate Final Count?: Yes       Blood Loss  Mother: Jun Avina #[id number]     Start of Mother's Information      Delivery Blood Loss  24 0255 - 24 1613      Quantitative Blood Loss (mL) Hospital Encounter 50 grams    Total  50 mL               End of Mother's Information  Mother: Jun Avina #[id number]                Delivery Providers    Delivering clinician: Flakito Mortensen APRN - CNM     Provider Role     Obstetrician    Lissette Ospina, RN Primary Nurse    Kelly Carranza RN Primary [place] Nurse    Gabriela Coulter OB Tech    Josefa Cota RN Charge Nurse    Flakito Mortensen APRN - CNM Midwife    [place], Nancy MORLEY RN Nursery Nurse    Ruslan, Damaris Student Nurse               Assessment    Living Status: Living        Skin Color:   Heart Rate:   Reflex Irritability:   Muscle Tone:   Respiratory Effort:   Total:            1 Minute:    0    2    2    2    2    8         5 Minute:    1    2    2    2    2    9                                        [place]pg[place] Assigned By: CHAKA CARRANZA RN              Resuscitation    Method: Stimulation, Bulb Suction             [place] Measurements               Skin to Skin      Skin to Skin Initiation Date/Time: 24 14:58:00 EDT     Skin to Skin With: Mother     Breastfeeding Initiated Date/Time: 2024 15:45:00

## 2024-08-24 NOTE — PROGRESS NOTES
Labor Note    Jun Avina  [id number]  1980   40w1d      S:  Coping well with irregular contractions. Denies c/o at this time.    O:    BP (!) 101/56   Pulse 77   Temp 97.7 °F (36.5 °C)   Resp 17   Ht 1.651 m (5' 5\")   Wt 94.3 kg (208 lb)   SpO2 97%   BMI 34.61 kg/m²        SVE: 3/50/-3, vertex, ballotable, membranes intact    FHT reviewed and remains CAT I   Baseline:130 bpm  Moderate variability  Accels: present  Decels: None  Ctx/[place]: irregular, mild to palpation, resting tone soft between    A/P:  43 y.o.  @ 40w1d admitted for induction of labor. Pregnancy course complicated by AMA, grand multiparity.  - GBS +  - CAT I FHT    CNM at bedside for pt evaluation.   SVE performed with pt consent. 3/50/-3.  Rec made for low dose pitocin with r/b/a. Pt agreeable.  Undecided on labor pain mgmt. May consider epidural.      - FWB:  CEFM/[place]  - Labor: Pitocin per unit protocol  - GBS: PCN as ordered  - Labor Pain: - Epidural when desires  - Anticipate     Signed:     ABRAHAM Lai-KATE

## 2024-08-24 NOTE — PLAN OF CARE
Problem: Safety - Adult  Goal: Free from fall injury  8/24/2024 0735 by Lissette Ospina, RN  Outcome: Progressing  8/23/2024 1918 by Mehdi Banks, RN  Outcome: Progressing

## 2024-08-24 NOTE — ANESTHESIA PROCEDURE NOTES
Epidural Block    Patient location during procedure: OB  Start time: 8/24/2024 2:02 PM  End time: 8/24/2024 2:20 PM  Reason for block: labor epidural  Staffing  Performed: anesthesiologist   Performed by: Kane Velázquez MD  Authorized by: Kane Velázquez MD    Epidural  Patient position: sitting  Prep: Betadine  Patient monitoring: frequent blood pressure checks and continuous pulse ox  Location: L3-4  Injection technique: MORRIS saline  Provider prep: mask and sterile gloves  Needle  Needle type: Tuohy   Needle gauge: 17 G  Needle length: 6 in  Needle insertion depth: 6 cm  Catheter type: multi-orifice  Catheter at skin depth: 10 cm  Test dose: negativeCatheter Secured: tegaderm and tape  Assessment  Hemodynamics: stable  Attempts: 1  Outcomes: uncomplicated and patient tolerated procedure well  Additional Notes  Lidocaine 1.5% with epi 1:200K 3cc injected, negative test dose  Bupivicaine 0.25% 10cc injected in epidural.

## 2024-08-24 NOTE — ANESTHESIA PRE PROCEDURE
Department of Anesthesiology  Preprocedure Note       Name:  Jun Avina   Age:  43 y.o.  :  1980                                          MRN:  [id number]         Date:  2024      Surgeon: * No surgeons listed *    Procedure: * No procedures listed *    Medications prior to admission:   Prior to Admission medications    Medication Sig Start Date End Date Taking? Authorizing Provider   folic acid (FOLVITE) 400 MCG tablet Take 400 mcg by mouth daily    Automatic Reconciliation, Ar   ondansetron (ZOFRAN) 4 MG tablet Take 4 mg by mouth every 8 hours as needed    Automatic Reconciliation, Ar       Current medications:    Current Facility-Administered Medications   Medication Dose Route Frequency Provider Last Rate Last Admin   • oxytocin (PITOCIN) 30 units in 500 mL infusion  1-20 genie-units/min IntraVENous Continuous Hannah Ospina APRN - CNM   Stopped at 24 1318   • fentaNYL 2 mcg/mL BUPivacaine 0.125% in sodium chloride 0.9% 100 mL epidural infusion  10 mL/hr Epidural Continuous Kane Velázquez MD       • naloxone 0.4 mg in 10 mL sodium chloride syringe   IntraVENous PRN Kane Velázquez MD       • ondansetron (ZOFRAN) injection 4 mg  4 mg IntraVENous Q6H PRN Kane Velázquez MD       • lactated ringers IV soln infusion   IntraVENous Continuous Tonia Villalba  mL/hr at 24 1435 Rate Change at 24 1435   • lactated ringers bolus 500 mL  500 mL IntraVENous PRN Tonia Villalba MD        Or   • lactated ringers bolus 500 mL  500 mL IntraVENous PRN Tonia Villalba MD       • sodium chloride flush 0.9 % injection 5-40 mL  5-40 mL IntraVENous 2 times per day Tonia Villalba MD       • sodium chloride flush 0.9 % injection 5-40 mL  5-40 mL IntraVENous PRN Tonia Villalba MD       • 0.9 % sodium chloride infusion  25 mL IntraVENous PRN Tonia Villalba MD       • methylergonovine (METHERGINE) injection 200 mcg  200 mcg

## 2024-08-24 NOTE — PROGRESS NOTES
Labor Progress Note     Patient: Jun Avina MRN: [id number]  SSN: [id number]    YOB: 1980  Age: 43 y.o.  Sex: female        Subjective:   Patient evaluated and is coping well with contractions.  Objective:   Blood pressure 122/73, pulse 85, temperature 98.6 °F (37 °C), temperature source Oral, resp. rate 17, height 1.651 m (5' 5\"), weight 94.3 kg (208 lb), SpO2 100%.    Sterile Vaginal Exam: /-2  Membranes:  AROM- clear fluid   EFM:  - Fetal Heart Rate: Baseline Rate: 145 bpm    - Uterine Activity: every 2-3 min   -Pit: 10 mu/min        Assessment:    SIUP @ 40w1d   IOL  GBS positive  Category 1 fetal heart rate tracing    Plan:   AROM'd after patient consent  All questions answered, and pt/partner agree with plan of care  Anticipate         Signed By:  ABRAHAM Patterson CNM      2024 12:26 PM

## 2024-08-25 LAB
GLUCOSE BLD STRIP.AUTO-MCNC: 134 MG/DL (ref 65–117)
SERVICE CMNT-IMP: ABNORMAL

## 2024-08-25 PROCEDURE — 94761 N-INVAS EAR/PLS OXIMETRY MLT: CPT

## 2024-08-25 PROCEDURE — 6370000000 HC RX 637 (ALT 250 FOR IP): Performed by: ADVANCED PRACTICE MIDWIFE

## 2024-08-25 PROCEDURE — 1120000000 HC RM PRIVATE OB

## 2024-08-25 RX ADMIN — IBUPROFEN 800 MG: 800 TABLET ORAL at 06:31

## 2024-08-25 RX ADMIN — DOCUSATE SODIUM 100 MG: 100 CAPSULE, LIQUID FILLED ORAL at 20:54

## 2024-08-25 RX ADMIN — IBUPROFEN 800 MG: 800 TABLET ORAL at 15:01

## 2024-08-25 ASSESSMENT — PAIN DESCRIPTION - DESCRIPTORS
DESCRIPTORS: CRAMPING

## 2024-08-25 ASSESSMENT — PAIN SCALES - GENERAL
PAINLEVEL_OUTOF10: 4
PAINLEVEL_OUTOF10: 2

## 2024-08-25 ASSESSMENT — PAIN DESCRIPTION - ORIENTATION: ORIENTATION: ANTERIOR

## 2024-08-25 ASSESSMENT — PAIN DESCRIPTION - LOCATION
LOCATION: ABDOMEN

## 2024-08-25 NOTE — PROGRESS NOTES
Post-Partum Day Number 1 Progress Note    Jun Avina     Assessment: Doing well, post partum day 1    Plan:  - Continue routine postpartum and perineal care as well as maternal education.  - The risks and benefits of the circumcision  procedure and anesthesia including: bleeding, infection, variability of cosmetic results were discussed at length with the mother. She is aware that future repeat procedures may be necessary. The family had hesitancy about the use of lidocaine during procedure, were going to \"do their research\" and get back to us about comfort regarding procedure.  - Plan discharge home tomorrow.    Information for the patient's :  Anayeli Avina [[id number]]   , Spontaneous Patient doing well without significant complaint.  Voiding without difficulty, normal lochia.    Vitals:  /77   Pulse 74   Temp 98.2 °F (36.8 °C) (Oral)   Resp 16   Ht 1.651 m (5' 5\")   Wt 94.3 kg (208 lb)   SpO2 97%   Breastfeeding Unknown   BMI 34.61 kg/m²   Temp (24hrs), Av.4 °F (36.9 °C), Min:97.4 °F (36.3 °C), Max:99.5 °F (37.5 °C)        Exam:   Patient without distress.                  Fundus firm, nontender per nursing fundal checks.                Perineum with normal lochia noted per nursing assessment.                Lower extremities are negative for pathological edema.    Labs:     Lab Results   Component Value Date/Time    WBC 9.5 2024 02:30 PM    WBC 12.8 2023 12:11 AM    WBC 12.9 2022 11:49 PM    HGB 11.0 2024 02:30 PM    HGB 11.5 2023 12:11 AM    HGB 12.4 2022 11:49 PM    HCT 32.9 2024 02:30 PM    HCT 34.7 2023 12:11 AM    HCT 36.9 2022 11:49 PM     2024 02:30 PM     2023 12:11 AM     2022 11:49 PM       Recent Results (from the past 24 hour(s))   Comprehensive Metabolic Panel    Collection Time: 24  4:17 PM   Result Value Ref Range    Sodium 136 136 - 145 mmol/L     Potassium 3.8 3.5 - 5.1 mmol/L    Chloride 103 97 - 108 mmol/L    CO2 24 21 - 32 mmol/L    Anion Gap 9 5 - 15 mmol/L    Glucose 90 65 - 100 mg/dL    BUN 2 (L) 6 - 20 MG/DL    Creatinine 0.59 0.55 - 1.02 MG/DL    BUN/Creatinine Ratio 3 (L) 12 - 20      Est, Glom Filt Rate >90 >60 ml/min/1.73m2    Calcium 8.5 8.5 - 10.1 MG/DL    Total Bilirubin 0.5 0.2 - 1.0 MG/DL    ALT 20 12 - 78 U/L    AST 27 15 - 37 U/L    Alk Phosphatase 143 (H) 45 - 117 U/L    Total Protein 6.0 (L) 6.4 - 8.2 g/dL    Albumin 2.6 (L) 3.5 - 5.0 g/dL    Globulin 3.4 2.0 - 4.0 g/dL    Albumin/Globulin Ratio 0.8 (L) 1.1 - 2.2     Extra Tubes Hold    Collection Time: 08/24/24  4:23 PM   Result Value Ref Range    Specimen HOld  1SST, 1LAV, 1BLU     Comment:        Add-on orders for these samples will be processed based on acceptable specimen integrity and analyte stability, which may vary by analyte.   POCT Glucose    Collection Time: 08/24/24  6:19 PM   Result Value Ref Range    POC Glucose 134 (H) 65 - 117 mg/dL    Performed by: Bhavesh Mclaughlin

## 2024-08-26 VITALS
RESPIRATION RATE: 18 BRPM | OXYGEN SATURATION: 98 % | HEART RATE: 74 BPM | DIASTOLIC BLOOD PRESSURE: 77 MMHG | TEMPERATURE: 97.9 F | WEIGHT: 208 LBS | SYSTOLIC BLOOD PRESSURE: 110 MMHG | HEIGHT: 65 IN | BODY MASS INDEX: 34.66 KG/M2

## 2024-08-26 LAB — T PALLIDUM AB SER QL IA: NON REACTIVE

## 2024-08-26 PROCEDURE — 94761 N-INVAS EAR/PLS OXIMETRY MLT: CPT

## 2024-08-26 PROCEDURE — 6370000000 HC RX 637 (ALT 250 FOR IP): Performed by: ADVANCED PRACTICE MIDWIFE

## 2024-08-26 RX ORDER — IBUPROFEN 800 MG/1
800 TABLET, FILM COATED ORAL EVERY 8 HOURS SCHEDULED
Qty: 30 TABLET | Refills: 0 | Status: SHIPPED | OUTPATIENT
Start: 2024-08-26

## 2024-08-26 RX ADMIN — IBUPROFEN 800 MG: 800 TABLET ORAL at 00:45

## 2024-08-26 RX ADMIN — IBUPROFEN 800 MG: 800 TABLET ORAL at 08:15

## 2024-08-26 RX ADMIN — DOCUSATE SODIUM 100 MG: 100 CAPSULE, LIQUID FILLED ORAL at 08:15

## 2024-08-26 ASSESSMENT — PAIN DESCRIPTION - LOCATION: LOCATION: ABDOMEN

## 2024-08-26 ASSESSMENT — PAIN DESCRIPTION - DESCRIPTORS: DESCRIPTORS: CRAMPING

## 2024-08-26 ASSESSMENT — PAIN DESCRIPTION - ORIENTATION: ORIENTATION: LOWER

## 2024-08-26 ASSESSMENT — PAIN SCALES - GENERAL
PAINLEVEL_OUTOF10: 3
PAINLEVEL_OUTOF10: 0

## 2024-08-26 ASSESSMENT — PAIN - FUNCTIONAL ASSESSMENT: PAIN_FUNCTIONAL_ASSESSMENT: ACTIVITIES ARE NOT PREVENTED

## 2024-08-26 NOTE — PROGRESS NOTES
Pt off unit in stable condition via wheelchair with volunteers for discharge home per Dr. Manley. Pt is aware to follow up in 6 weeks. Prescriptions electronically sent to pt's pharmacy by Dr. Manley.  Pt denies any HA, dizziness, N/V, or pain at this time. Infant in car seat and discharged with mother. Postpartum discharge teaching completed by this RN. Perineal supplies given to pt. Discharge papers signed by pt and RN.

## 2024-08-26 NOTE — PROGRESS NOTES
Post-Partum Day Number 2 Progress Note    Jun Avina     Assessment: Doing well, post partum day 2    Plan:   - Discharge home today  - Circ outpt  - Follow up in office in 6 week(s) with [facility] [place].  - Pain medication prescription(s) sent.  - Questions answered.    Information for the patient's :  Anayeli Avina [[id number]]   , Spontaneous Patient doing well without significant complaint.  Voiding without difficulty, normal lochia. Ready for discharge home.    Vitals:  /77   Pulse 74   Temp 97.9 °F (36.6 °C) (Oral)   Resp 18   Ht 1.651 m (5' 5\")   Wt 94.3 kg (208 lb)   SpO2 98%   Breastfeeding Unknown   BMI 34.61 kg/m²   Temp (24hrs), Av.9 °F (36.6 °C), Min:97.7 °F (36.5 °C), Max:98.1 °F (36.7 °C)      Exam:        Patient without distress.                Fundus firm, nontender per nursing fundal checks                Perineum with normal lochia noted per nursing assessment                Lower extremities are negative for pathological edema    Labs:     Lab Results   Component Value Date/Time    WBC 9.5 2024 02:30 PM    WBC 12.8 2023 12:11 AM    WBC 12.9 2022 11:49 PM    HGB 11.0 2024 02:30 PM    HGB 11.5 2023 12:11 AM    HGB 12.4 2022 11:49 PM    HCT 32.9 2024 02:30 PM    HCT 34.7 2023 12:11 AM    HCT 36.9 2022 11:49 PM     2024 02:30 PM     2023 12:11 AM     2022 11:49 PM       No results found for this or any previous visit (from the past 24 hour(s)).

## 2024-08-26 NOTE — PROGRESS NOTES
NUTRITION    Best practice alert was triggered based on results obtained during nursing admission assessment for Cultural/Moravian/Ethnic food preferences.     Comment states: No pork/meat    Nutrition assessment is not indicated at this time. Kitchen notified re: cultural/Yazidi preferences. Diet order updated as appropriate.. Plan to see patient for rescreen per policy.  Thank you.     STELLA NUNES RD, MS  Ext: [phone], or via [employer]

## 2024-08-26 NOTE — LACTATION NOTE
This note was copied from a baby's chart.  Mother dressed and baby dressed for discharge.  Mother states baby is BF well.  Mother states this is her 7th baby to BF.  Mother states she has been working on deepening the latch and feels that it is getting better.  Reassured mother.  Mothers questions answered.  Discharge info reviewed and printed info given.    Chart shows numerous feedings, void, stool WNL.  Discussed importance of monitoring outputs and feedings on first week of life.  Discussed ways to tell if baby is  getting enough breast milk, ie  voids and stools, change in color of stool, and return to birth wt within 2 weeks.  Follow up with pediatrician visit for weight check in 1-2 days (per AAP guidelines.)  Encouraged to call Warm Line  [phone]  for any questions/problems that arise. Mother also given breastfeeding support group dates and times for any future needs    Engorgement Care Guidelines:  Reviewed how milk is made and normal phases of milk production.  Taught care of engorged breasts - physiologic breastfeeding encouraged with use of cool packs (no ice directly on skin). Consider use of NSAIDS where appropriate for discomfort and inflammation. Can employ light touch, lymphatic drainage techniques on tender grandular tissues. Anticipatory guidance shared.    Pt will successfully establish breastfeeding by feeding in response to early feeding cues   or wake every 3h, will obtain deep latch, and will keep log of feedings/output.  Taught to BF at hunger cues and or q 2-3 hrs and to offer 10-20 drops of hand expressed colostrum at any non-feeds.      Left Breast: Soft   Right Breast: Soft  Position and Latch: Independently     Maternal Response: Relaxed and confident       Latch:  (did not see at breast at this time)

## 2024-08-26 NOTE — DISCHARGE SUMMARY
Obstetrical Discharge Summary     Name: Jun Avina MRN: [id number]  SSN: [id number]    YOB: 1980  Age: 43 y.o.  Sex: female      Admit Date: 2024    Discharge Date: 2024     Admitting Physician: Tonia Villalba MD     Attending Physician:  Zulma Manley MD     Admission Diagnoses: Labor and delivery indication for care or intervention [O75.9]  Decreased fetal movement affecting management of mother, antepartum [O36.8190]    Discharge Diagnoses:   Information for the patient's :  Anayeli Avina [[id number]]   @[id number]@     Additional Diagnoses:  No components found for: \"OBEXTABORH\", \"OBEXTABSCRN\", \"OBEXTRUBELLA\", \"OBEXTGRBS\"    Hospital Course: Patient was admitted for IOL due to decreased fetal movement. Labor and delivery were uncomplicated, nuchal cord x3 noted at delivery. Normal hospital course following the delivery.    Patient Instructions:   Current Discharge Medication List        START taking these medications    Details   ibuprofen (ADVIL;MOTRIN) 800 MG tablet Take 1 tablet by mouth every 8 hours  Qty: 30 tablet, Refills: 0           CONTINUE these medications which have NOT CHANGED    Details   folic acid (FOLVITE) 400 MCG tablet Take 400 mcg by mouth daily      ondansetron (ZOFRAN) 4 MG tablet Take 4 mg by mouth every 8 hours as needed             Disposition at Discharge: Home or self care    Condition at Discharge: Stable    Reference my discharge instructions.    No follow-ups on file.     Signed By:  Zulma Manley MD     2024